# Patient Record
Sex: MALE | Race: BLACK OR AFRICAN AMERICAN | NOT HISPANIC OR LATINO | URBAN - METROPOLITAN AREA
[De-identification: names, ages, dates, MRNs, and addresses within clinical notes are randomized per-mention and may not be internally consistent; named-entity substitution may affect disease eponyms.]

---

## 2017-01-11 ENCOUNTER — EMERGENCY (EMERGENCY)
Facility: HOSPITAL | Age: 52
LOS: 1 days | Discharge: HOME | End: 2017-01-11
Admitting: EMERGENCY MEDICINE
Payer: SELF-PAY

## 2017-01-11 VITALS
DIASTOLIC BLOOD PRESSURE: 89 MMHG | HEART RATE: 134 BPM | RESPIRATION RATE: 18 BRPM | TEMPERATURE: 99 F | OXYGEN SATURATION: 100 % | SYSTOLIC BLOOD PRESSURE: 152 MMHG

## 2017-01-11 VITALS
HEART RATE: 111 BPM | DIASTOLIC BLOOD PRESSURE: 77 MMHG | OXYGEN SATURATION: 100 % | RESPIRATION RATE: 18 BRPM | SYSTOLIC BLOOD PRESSURE: 142 MMHG

## 2017-01-11 LAB
ALBUMIN SERPL ELPH-MCNC: 4.4 G/DL — SIGNIFICANT CHANGE UP (ref 3.3–5)
ALP SERPL-CCNC: 125 U/L — HIGH (ref 40–120)
ALT FLD-CCNC: 32 U/L — SIGNIFICANT CHANGE UP (ref 4–41)
AMPHET UR-MCNC: NEGATIVE — SIGNIFICANT CHANGE UP
APAP SERPL-MCNC: < 15 UG/ML — LOW (ref 15–25)
APPEARANCE UR: CLEAR — SIGNIFICANT CHANGE UP
AST SERPL-CCNC: 41 U/L — HIGH (ref 4–40)
BACTERIA # UR AUTO: SIGNIFICANT CHANGE UP
BARBITURATES MEASUREMENT: NEGATIVE — SIGNIFICANT CHANGE UP
BARBITURATES UR SCN-MCNC: NEGATIVE — SIGNIFICANT CHANGE UP
BASOPHILS # BLD AUTO: 0.02 K/UL — SIGNIFICANT CHANGE UP (ref 0–0.2)
BASOPHILS NFR BLD AUTO: 0.3 % — SIGNIFICANT CHANGE UP (ref 0–2)
BENZODIAZ SERPL-MCNC: NEGATIVE — SIGNIFICANT CHANGE UP
BENZODIAZ UR-MCNC: NEGATIVE — SIGNIFICANT CHANGE UP
BILIRUB SERPL-MCNC: 0.3 MG/DL — SIGNIFICANT CHANGE UP (ref 0.2–1.2)
BILIRUB UR-MCNC: NEGATIVE — SIGNIFICANT CHANGE UP
BLOOD UR QL VISUAL: NEGATIVE — SIGNIFICANT CHANGE UP
BUN SERPL-MCNC: 15 MG/DL — SIGNIFICANT CHANGE UP (ref 7–23)
CALCIUM SERPL-MCNC: 9.6 MG/DL — SIGNIFICANT CHANGE UP (ref 8.4–10.5)
CANNABINOIDS UR-MCNC: NEGATIVE — SIGNIFICANT CHANGE UP
CHLORIDE SERPL-SCNC: 106 MMOL/L — SIGNIFICANT CHANGE UP (ref 98–107)
CO2 SERPL-SCNC: 22 MMOL/L — SIGNIFICANT CHANGE UP (ref 22–31)
COCAINE METAB.OTHER UR-MCNC: NEGATIVE — SIGNIFICANT CHANGE UP
COLOR SPEC: YELLOW — SIGNIFICANT CHANGE UP
CREAT SERPL-MCNC: 1.05 MG/DL — SIGNIFICANT CHANGE UP (ref 0.5–1.3)
EOSINOPHIL # BLD AUTO: 0.1 K/UL — SIGNIFICANT CHANGE UP (ref 0–0.5)
EOSINOPHIL NFR BLD AUTO: 1.7 % — SIGNIFICANT CHANGE UP (ref 0–6)
ETHANOL BLD-MCNC: < 10 MG/DL — SIGNIFICANT CHANGE UP
GLUCOSE SERPL-MCNC: 136 MG/DL — HIGH (ref 70–99)
GLUCOSE UR-MCNC: NEGATIVE — SIGNIFICANT CHANGE UP
GRAN CASTS # UR COMP ASSIST: SIGNIFICANT CHANGE UP
HCT VFR BLD CALC: 44.9 % — SIGNIFICANT CHANGE UP (ref 39–50)
HGB BLD-MCNC: 15.2 G/DL — SIGNIFICANT CHANGE UP (ref 13–17)
HYALINE CASTS # UR AUTO: SIGNIFICANT CHANGE UP (ref 0–?)
IMM GRANULOCYTES NFR BLD AUTO: 0.5 % — SIGNIFICANT CHANGE UP (ref 0–1.5)
KETONES UR-MCNC: NEGATIVE — SIGNIFICANT CHANGE UP
LEUKOCYTE ESTERASE UR-ACNC: NEGATIVE — SIGNIFICANT CHANGE UP
LYMPHOCYTES # BLD AUTO: 1.82 K/UL — SIGNIFICANT CHANGE UP (ref 1–3.3)
LYMPHOCYTES # BLD AUTO: 30.8 % — SIGNIFICANT CHANGE UP (ref 13–44)
MCHC RBC-ENTMCNC: 31.1 PG — SIGNIFICANT CHANGE UP (ref 27–34)
MCHC RBC-ENTMCNC: 33.9 % — SIGNIFICANT CHANGE UP (ref 32–36)
MCV RBC AUTO: 92 FL — SIGNIFICANT CHANGE UP (ref 80–100)
METHADONE UR-MCNC: NEGATIVE — SIGNIFICANT CHANGE UP
MONOCYTES # BLD AUTO: 0.32 K/UL — SIGNIFICANT CHANGE UP (ref 0–0.9)
MONOCYTES NFR BLD AUTO: 5.4 % — SIGNIFICANT CHANGE UP (ref 2–14)
MUCOUS THREADS # UR AUTO: SIGNIFICANT CHANGE UP
NEUTROPHILS # BLD AUTO: 3.61 K/UL — SIGNIFICANT CHANGE UP (ref 1.8–7.4)
NEUTROPHILS NFR BLD AUTO: 61.3 % — SIGNIFICANT CHANGE UP (ref 43–77)
NITRITE UR-MCNC: NEGATIVE — SIGNIFICANT CHANGE UP
OPIATES UR-MCNC: NEGATIVE — SIGNIFICANT CHANGE UP
OXYCODONE UR-MCNC: NEGATIVE — SIGNIFICANT CHANGE UP
PCP UR-MCNC: NEGATIVE — SIGNIFICANT CHANGE UP
PH UR: 6 — SIGNIFICANT CHANGE UP (ref 4.6–8)
PLATELET # BLD AUTO: 242 K/UL — SIGNIFICANT CHANGE UP (ref 150–400)
PMV BLD: 12 FL — SIGNIFICANT CHANGE UP (ref 7–13)
POTASSIUM SERPL-MCNC: 3.9 MMOL/L — SIGNIFICANT CHANGE UP (ref 3.5–5.3)
POTASSIUM SERPL-SCNC: 3.9 MMOL/L — SIGNIFICANT CHANGE UP (ref 3.5–5.3)
PROT SERPL-MCNC: 8.2 G/DL — SIGNIFICANT CHANGE UP (ref 6–8.3)
PROT UR-MCNC: 30 — SIGNIFICANT CHANGE UP
RBC # BLD: 4.88 M/UL — SIGNIFICANT CHANGE UP (ref 4.2–5.8)
RBC # FLD: 12.4 % — SIGNIFICANT CHANGE UP (ref 10.3–14.5)
RBC CASTS # UR COMP ASSIST: SIGNIFICANT CHANGE UP (ref 0–?)
SALICYLATES SERPL-MCNC: < 5 MG/DL — LOW (ref 15–30)
SODIUM SERPL-SCNC: 143 MMOL/L — SIGNIFICANT CHANGE UP (ref 135–145)
SP GR SPEC: 1.02 — SIGNIFICANT CHANGE UP (ref 1–1.03)
SQUAMOUS # UR AUTO: SIGNIFICANT CHANGE UP
TSH SERPL-MCNC: 0.75 UIU/ML — SIGNIFICANT CHANGE UP (ref 0.27–4.2)
UROBILINOGEN FLD QL: NORMAL E.U. — SIGNIFICANT CHANGE UP (ref 0.1–0.2)
WBC # BLD: 5.9 K/UL — SIGNIFICANT CHANGE UP (ref 3.8–10.5)
WBC # FLD AUTO: 5.9 K/UL — SIGNIFICANT CHANGE UP (ref 3.8–10.5)
WBC UR QL: SIGNIFICANT CHANGE UP (ref 0–?)

## 2017-01-11 PROCEDURE — 93010 ELECTROCARDIOGRAM REPORT: CPT

## 2017-01-11 PROCEDURE — 99284 EMERGENCY DEPT VISIT MOD MDM: CPT | Mod: 25

## 2017-01-11 PROCEDURE — 90792 PSYCH DIAG EVAL W/MED SRVCS: CPT

## 2017-01-11 NOTE — ED BEHAVIORAL HEALTH ASSESSMENT NOTE - HPI (INCLUDE ILLNESS QUALITY, SEVERITY, DURATION, TIMING, CONTEXT, MODIFYING FACTORS, ASSOCIATED SIGNS AND SYMPTOMS)
50 year old AA male, single, non-caregiver, unemployed, domiciled with mother in a private house in [place], prior psychiatric history of schizophrenia last admitted at [facility] 02/22/16-3/15/16, no prior medical issues or history of substance abuse,  brought to the ER today by EMS and [employer] as per EMS patient's mother called them to saying patient is non-compliant with his medication and was verbally threatening today.    Collateral information obtained from mother Julissa Dorsey [phone], by SW, see  note.     On interview, patient reports that he was brought in because his mother is a "liar and making up stories". This was same complaint as last presentation 2/2016 as well as similar overall presentation. He reports that when he woke up today he saw several police that came to the house and told him he had to come get checked out. Reports that he is on abilify and takes it intermittently. Denies SI/HI/I/P. Patient denies any depressive symptoms including depressed mood, anhedonia, changes in energy/concentration/appetite, sleep disturbances, or feelings of guilt. Patient denies manic symptoms including elevated mood, increased irritability, mood lability, distractibility, grandiosity, pressured speech, increase in goal-directed activity, or decreased need for sleep. Patient denies any psychotic symptoms including paranoia, ideas of reference, thought insertion/broadcasting, or auditory/visual/olfactory/tactile/gustatory hallucinations.     After careful review of last inpatient record, patient had reported that he felt that [employer] [place] was part of a gang and after him, but he denied these thoughts today and denies that anyone is after him. During last admission patient also refused medications on the unit after 2 days and was taken to court for medication over objection, which was denied and patient was allowed to be discharged at that point because he was not felt to be a danger to others at that time.

## 2017-01-11 NOTE — ED ADULT NURSE NOTE - OBJECTIVE STATEMENT
As per ems, mom called to report of med non compliance and aggression @ home.  Pt. report that "my mom is lying"  Pt. denies aggression, but appears irritable, guarded and paranoid.   Cooperative @ present.  pt. denies si/hi/ah, denies drug/etoh use.  Pt. checked for safety, belongings secured.

## 2017-01-11 NOTE — ED PROVIDER NOTE - CHPI ED SYMPTOMS NEG
no suicidal/no weight loss/no homicidal/no hallucinations/no weakness/no change in level of consciousness/no disorientation

## 2017-01-11 NOTE — ED BEHAVIORAL HEALTH ASSESSMENT NOTE - RISK ASSESSMENT
risk factors include prior admissions, medication non compliance    Protective factors- Protective factors include no suicide attempts, no violence history, no access to guns, no  no substance abuse, supportive family, no suicidal ideation or homicidal ideation, hopefulness for future.

## 2017-01-11 NOTE — ED BEHAVIORAL HEALTH ASSESSMENT NOTE - OTHER PAST PSYCHIATRIC HISTORY (INCLUDE DETAILS REGARDING ONSET, COURSE OF ILLNESS, INPATIENT/OUTPATIENT TREATMENT)
One prior admission at [facility] in 1990s when he locked himself in the apartment and refused to leave. No documented or reported hx of suicide attempt or aggression.  Given  referral after ED visit 6/15  In AO from 2/20/2001 from 11/21/2014    According to last note from 8/14 patient was denying delusions, paranoia or other psychotic symptoms. Note indicated RM from symptoms.

## 2017-01-11 NOTE — ED BEHAVIORAL HEALTH ASSESSMENT NOTE - SUMMARY
50 year old AA male, single, non-caregiver, unemployed, domiciled with mother in a private house in [place], prior psychiatric history of schizophrenia last admitted at [facility] 02/22/16-3/15/16, no prior medical issues or history of substance abuse,  brought to the ER today by EMS and [employer] as per EMS patient's mother called them to saying patient is non-compliant with his medication and was verbally threatening today.    Patient had a similar presentation in 2/2016 and was admitted - [facility] chart review done of that admission. Patient refused medication and was taken court for medication over objection, where the patient represented himself, and was allowed to be discharged as he was deemed not to be a danger to others or himself. Patient today was verbally agitated towards mother and has been non compliant with his medications, however, did not harm her and has never been physical with her per SW reports. Patient is denying SI/HI/I/P as well as sofi and acute psychotic symptoms. He still has same delusion that mother is "telling lies about me", but does not want to harm her. He is denying delusions about [employer] [place] being part of a gang and after him, which he had endorsed at the time of last admission. He is refusing voluntary admission and does not meet criteria for involuntary admission at this time. He will be discharged home to follow up with outpatient referrals provided, as well as shelter information as mother does not want patient to return home.

## 2017-01-11 NOTE — ED PROVIDER NOTE - MEDICAL DECISION MAKING DETAILS
51y Male hx schizophrenia non compliant with meds brought to ed by [facility] for psych eval.  Pt appears agitated and guarded on initial eval.  Pt is w/o visible signs of injuries on exam- Head- NCAT, labs unrevealing.  Sinus tach 135 on initial ECG w/ first degree AV block in setting of agitation but no narrative suggestive of ACS-   on repeat ECG w/ one liter of po hydration-  Will continue to hydrate and dispo as per psych-

## 2017-01-11 NOTE — ED PROVIDER NOTE - CARE PLAN
Principal Discharge DX:	Schizophrenia  Instructions for follow-up, activity and diet:	Please make sure to continue drinking water to keep yourself well hydrated.  Return for dizziness, chest pain or shortness of breath, palpitation, nausea or vomiting, visual changes.  Please return for any other concerns.

## 2017-01-11 NOTE — ED BEHAVIORAL HEALTH ASSESSMENT NOTE - DESCRIPTION
Patient was calm and cooperative he was not aggressive or violent. 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

## 2017-01-11 NOTE — ED PROVIDER NOTE - OBJECTIVE STATEMENT
The patient is a 51y Male hx schizophrenia non compliant with meds brought to ED by [facility] for psych eval.  Pt states that his mother is lying.  Pt denies recent injuries, trauma or falls, no headache, back or neck pain, no abd pain, nausea or vomiting, no fever or chills, no cp or sob, no palpitation or diaphoresis or jaw pain.  Denies etoh or illicit drugs, no SI/HI, no AVH.  Endorsed no other symptoms.

## 2017-01-11 NOTE — ED PROVIDER NOTE - PLAN OF CARE
Please make sure to continue drinking water to keep yourself well hydrated.  Return for dizziness, chest pain or shortness of breath, palpitation, nausea or vomiting, visual changes.  Please return for any other concerns.
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
## 2017-10-19 ENCOUNTER — INPATIENT (INPATIENT)
Facility: HOSPITAL | Age: 52
LOS: 25 days | Discharge: HOME | End: 2017-11-14
Attending: PSYCHIATRY & NEUROLOGY | Admitting: PSYCHIATRY & NEUROLOGY
Payer: MEDICAID

## 2017-10-19 VITALS
SYSTOLIC BLOOD PRESSURE: 151 MMHG | OXYGEN SATURATION: 99 % | DIASTOLIC BLOOD PRESSURE: 88 MMHG | RESPIRATION RATE: 15 BRPM | HEART RATE: 96 BPM | TEMPERATURE: 98 F

## 2017-10-19 LAB
ALBUMIN SERPL ELPH-MCNC: 4.4 G/DL — SIGNIFICANT CHANGE UP (ref 3.3–5)
ALP SERPL-CCNC: 129 U/L — HIGH (ref 40–120)
ALT FLD-CCNC: 21 U/L — SIGNIFICANT CHANGE UP (ref 4–41)
APPEARANCE UR: CLEAR — SIGNIFICANT CHANGE UP
AST SERPL-CCNC: 26 U/L — SIGNIFICANT CHANGE UP (ref 4–40)
BASOPHILS # BLD AUTO: 0.05 K/UL — SIGNIFICANT CHANGE UP (ref 0–0.2)
BASOPHILS NFR BLD AUTO: 0.8 % — SIGNIFICANT CHANGE UP (ref 0–2)
BILIRUB SERPL-MCNC: 0.3 MG/DL — SIGNIFICANT CHANGE UP (ref 0.2–1.2)
BILIRUB UR-MCNC: NEGATIVE — SIGNIFICANT CHANGE UP
BLOOD UR QL VISUAL: NEGATIVE — SIGNIFICANT CHANGE UP
BUN SERPL-MCNC: 13 MG/DL — SIGNIFICANT CHANGE UP (ref 7–23)
CALCIUM SERPL-MCNC: 9.3 MG/DL — SIGNIFICANT CHANGE UP (ref 8.4–10.5)
CHLORIDE SERPL-SCNC: 100 MMOL/L — SIGNIFICANT CHANGE UP (ref 98–107)
CO2 SERPL-SCNC: 25 MMOL/L — SIGNIFICANT CHANGE UP (ref 22–31)
COLOR SPEC: YELLOW — SIGNIFICANT CHANGE UP
CREAT SERPL-MCNC: 1.04 MG/DL — SIGNIFICANT CHANGE UP (ref 0.5–1.3)
EOSINOPHIL # BLD AUTO: 0.32 K/UL — SIGNIFICANT CHANGE UP (ref 0–0.5)
EOSINOPHIL NFR BLD AUTO: 5.1 % — SIGNIFICANT CHANGE UP (ref 0–6)
GLUCOSE SERPL-MCNC: 144 MG/DL — HIGH (ref 70–99)
GLUCOSE UR-MCNC: NEGATIVE — SIGNIFICANT CHANGE UP
GRAN CASTS # UR COMP ASSIST: SIGNIFICANT CHANGE UP
HCT VFR BLD CALC: 44.5 % — SIGNIFICANT CHANGE UP (ref 39–50)
HGB BLD-MCNC: 14.7 G/DL — SIGNIFICANT CHANGE UP (ref 13–17)
HYALINE CASTS # UR AUTO: SIGNIFICANT CHANGE UP (ref 0–?)
IMM GRANULOCYTES # BLD AUTO: 0.01 # — SIGNIFICANT CHANGE UP
IMM GRANULOCYTES NFR BLD AUTO: 0.2 % — SIGNIFICANT CHANGE UP (ref 0–1.5)
KETONES UR-MCNC: NEGATIVE — SIGNIFICANT CHANGE UP
LEUKOCYTE ESTERASE UR-ACNC: NEGATIVE — SIGNIFICANT CHANGE UP
LYMPHOCYTES # BLD AUTO: 2.6 K/UL — SIGNIFICANT CHANGE UP (ref 1–3.3)
LYMPHOCYTES # BLD AUTO: 41.1 % — SIGNIFICANT CHANGE UP (ref 13–44)
MCHC RBC-ENTMCNC: 30.5 PG — SIGNIFICANT CHANGE UP (ref 27–34)
MCHC RBC-ENTMCNC: 33 % — SIGNIFICANT CHANGE UP (ref 32–36)
MCV RBC AUTO: 92.3 FL — SIGNIFICANT CHANGE UP (ref 80–100)
MONOCYTES # BLD AUTO: 0.5 K/UL — SIGNIFICANT CHANGE UP (ref 0–0.9)
MONOCYTES NFR BLD AUTO: 7.9 % — SIGNIFICANT CHANGE UP (ref 2–14)
MUCOUS THREADS # UR AUTO: SIGNIFICANT CHANGE UP
NEUTROPHILS # BLD AUTO: 2.85 K/UL — SIGNIFICANT CHANGE UP (ref 1.8–7.4)
NEUTROPHILS NFR BLD AUTO: 44.9 % — SIGNIFICANT CHANGE UP (ref 43–77)
NITRITE UR-MCNC: NEGATIVE — SIGNIFICANT CHANGE UP
NRBC # FLD: 0 — SIGNIFICANT CHANGE UP
PH UR: 6 — SIGNIFICANT CHANGE UP (ref 4.6–8)
PLATELET # BLD AUTO: 190 K/UL — SIGNIFICANT CHANGE UP (ref 150–400)
PMV BLD: 12 FL — SIGNIFICANT CHANGE UP (ref 7–13)
POTASSIUM SERPL-MCNC: 3 MMOL/L — LOW (ref 3.5–5.3)
POTASSIUM SERPL-SCNC: 3 MMOL/L — LOW (ref 3.5–5.3)
PROT SERPL-MCNC: 8 G/DL — SIGNIFICANT CHANGE UP (ref 6–8.3)
PROT UR-MCNC: 100 — SIGNIFICANT CHANGE UP
RBC # BLD: 4.82 M/UL — SIGNIFICANT CHANGE UP (ref 4.2–5.8)
RBC # FLD: 12.5 % — SIGNIFICANT CHANGE UP (ref 10.3–14.5)
RBC CASTS # UR COMP ASSIST: SIGNIFICANT CHANGE UP (ref 0–?)
SODIUM SERPL-SCNC: 141 MMOL/L — SIGNIFICANT CHANGE UP (ref 135–145)
SP GR SPEC: 1.02 — SIGNIFICANT CHANGE UP (ref 1–1.03)
SQUAMOUS # UR AUTO: SIGNIFICANT CHANGE UP
UROBILINOGEN FLD QL: NORMAL E.U. — SIGNIFICANT CHANGE UP (ref 0.1–0.2)
WBC # BLD: 6.33 K/UL — SIGNIFICANT CHANGE UP (ref 3.8–10.5)
WBC # FLD AUTO: 6.33 K/UL — SIGNIFICANT CHANGE UP (ref 3.8–10.5)
WBC UR QL: SIGNIFICANT CHANGE UP (ref 0–?)

## 2017-10-19 RX ORDER — HALOPERIDOL DECANOATE 100 MG/ML
5 INJECTION INTRAMUSCULAR ONCE
Qty: 0 | Refills: 0 | Status: COMPLETED | OUTPATIENT
Start: 2017-10-19 | End: 2017-10-19

## 2017-10-19 RX ORDER — DIPHENHYDRAMINE HCL 50 MG
50 CAPSULE ORAL ONCE
Qty: 0 | Refills: 0 | Status: COMPLETED | OUTPATIENT
Start: 2017-10-19 | End: 2017-10-19

## 2017-10-19 RX ADMIN — Medication 2 MILLIGRAM(S): at 22:23

## 2017-10-19 RX ADMIN — Medication 50 MILLIGRAM(S): at 22:23

## 2017-10-19 RX ADMIN — HALOPERIDOL DECANOATE 5 MILLIGRAM(S): 100 INJECTION INTRAMUSCULAR at 22:23

## 2017-10-19 NOTE — ED ADULT TRIAGE NOTE - CHIEF COMPLAINT QUOTE
Pt from home, history of paranoid schizophrenia.  Mother states pt refuses to take medications, paranoia has been getting worse.   NP called, pt taken to  by EMS.

## 2017-10-19 NOTE — ED PROVIDER NOTE - OBJECTIVE STATEMENT
This is a 52 year old Male PMHX paranoid schizophrenia BIBIA from Home for psych eval r/t medication noncompliance. Per EMS patient lives with his mother who cares for patient. Reports he has been medication noncompliant with Abilify for 1 1.2 years and is recently decompensating. Mother stated to EMS she feels unsafe.  Patient arrives agitated, argumentative and paranoid. Patient posed a threat to self and staff and required Ativan 2mg/Haldol 5mg/Benadryl 50 mg IM x 1 for safety.

## 2017-10-19 NOTE — ED ADULT NURSE NOTE - OBJECTIVE STATEMENT
BIB ems, mother called 911 because has patient has been decompensating at home and she feels "unsafe". As per ems and mother, Pt has hx of paranoid schizophrenia and has been noncompliant with treatment over one year. Pt is awake, hyperverbal with pressured speech. Pt is disorganized and irritable, requires frequent redirection. Unable to assess for s/i h/i or drug use.

## 2017-10-19 NOTE — ED BEHAVIORAL HEALTH NOTE - BEHAVIORAL HEALTH NOTE
Writer spoke with patient’s mother, Julissa Dorsey, [phone], on the phone, for collateral information. She reported the following:    The patient resides with the informant. He has had 2 past IP episodes, at [facility], which chart review indicates occurred in 2016 and 2015. He has never attended OP treatment, and has been off medications since the passing of his father in 2012, except minimally during IP care. Today, he was aggressive, so the informant called 911.    The patient is irritable and somewhat verbally aggressive at baseline. However, for the past few weeks this behavior has intensified so much that the informant is now so afraid of the patient that she stated she cannot have him in her home at this time. When the informant entered the kitchen, where the patient was, he yelled at her, telling her to get out of the house, and stop harassing him. He told her at another time to go upstairs to find something to eat, though there is no food upstairs. He got in her face, yelling “I’m telling you, you better get out of here.” The informant asked what he would do to her, and he refused to answer. He was yelling at his mother that she is “crazy, stupid and psychotic,” which he has never done before. He has been eating a great deal, but his weight is stable. He sleeps with reverse diurnal variation, pacing at night. He has been responding to internal stimuli, smiling to himself often. He is totally socially isolative and will not speak with his mother except about food, briefly. Since his mother is unable to go to the store, he does the shopping. However, he is so paranoid that he looks outside to see if anyone is near. He yells at his mother if she looks at him, telling him it is “illegal and a crime.” He jumps if his mother is behind him, demanding to know what she was planning to do. He has been running water excessively, such as by washing dishes many times, creating an alarming water bill. This is a new behavior. The patient has not expressed passive or active suicidal ideation, and has never engaged in self-injurious behavior. He has not told the informant what he may do to her or anyone else. He does not have access to a gun. He has not been violent or damaged property.     At the time of this writing and the end of writer’s shift, the patient had not yet been evaluated, and a disposition had not been determined. Writer spoke with patient’s mother, Julissa Dorsey, [phone], on the phone, for collateral information. She reported the following:    The patient resides with the informant. He has had 2 past IP episodes, at [facility], which chart review indicates occurred in 2016 and 2015. He has never attended OP treatment, and has been off medications since the passing of his father in 2012, except minimally during IP care. Today, he was aggressive, so the informant called 911.    The patient is irritable and somewhat verbally aggressive at baseline. However, for the past few weeks this behavior has intensified so much that the informant is now so afraid of the patient that she stated she cannot have him in her home at this time. When the informant entered the kitchen, where the patient was, he yelled at her, telling her to get out of the house, and stop harassing him. He told her at another time to go upstairs to find something to eat, though there is no food upstairs. He got in her face, yelling “I’m telling you, you better get out of here.” The informant asked what he would do to her, and he refused to answer. He was yelling at his mother that she is “crazy, stupid and psychotic,” which he has never done before. He has been eating a great deal, but his weight is stable. He sleeps with reverse diurnal variation, pacing at night. He has been responding to internal stimuli, smiling to himself often. He is totally socially isolative and will not speak with his mother except about food, briefly. Since his mother is unable to go to the store, he does the shopping. However, he is so paranoid that he looks outside to see if anyone is near. He yells at his mother if she looks at him, telling him it is “illegal and a crime.” He jumps if his mother is behind him, demanding to know what she was planning to do. He has been running water excessively, such as by washing dishes many times, creating an alarming water bill. This is a new behavior. The patient has not expressed passive or active suicidal ideation, and has never engaged in self-injurious behavior. He has not told the informant what he may do to her or anyone else. He does not have access to a gun. He has not been violent or damaged property.     The informant requests that the patient be admitted to inpatient care. At the time of this writing and the end of writer’s shift, the patient had not yet been evaluated, and a disposition had not been determined.

## 2017-10-19 NOTE — ED PROVIDER NOTE - PROGRESS NOTE DETAILS
Brittany: Medically cleared then signed out to me. Awaiting  eval/reassessment. Klepfish: Stable. Awaiting  reassessment, likely [facility] admit.

## 2017-10-19 NOTE — ED PROVIDER NOTE - MEDICAL DECISION MAKING DETAILS
This is a 52 year old Male PMHX paranoid schizophrenia BIBIA from Home for psych eval r/t medication noncompliance. Per EMS patient lives with his mother who cares for patient. Reports he has been medication noncompliant with Abilify for 1 1.2 years and is recently decompensating. Medical evaluation performed. There is no clinical evidence of intoxication or any acute medical problem requiring immediate intervention. Final disposition will be determined by psychiatrist.

## 2017-10-19 NOTE — ED BEHAVIORAL HEALTH NOTE - BEHAVIORAL HEALTH NOTE
Writer spoke with patient’s mother, Julissa Dorsey, [phone], on the phone, for collateral information. She reported the following:    The patient resides with the informant. He has had 2 past IP episodes, at [facility], which chart review indicates occurred in 2016 and 2015. He has never attended OP treatment, and has been off medications since the passing of his father in 2012, except minimally during IP care. Today, he was aggressive, so the informant called 911.    The patient is irritable and somewhat verbally aggressive at baseline. However, for the past few weeks this behavior has intensified so much that the informant is now so afraid of the patient that she stated she cannot have him in her home at this time. When the informant entered the kitchen, where the patient was, he yelled at her, telling her to get out of the house, and stop harassing him. He told her at another time to go upstairs to find something to eat, though there is no food upstairs. He got in her face, yelling “I’m telling you, you better get out of here.” The informant asked what he would do to her, and he refused to answer. He was yelling at his mother that she is “crazy, stupid and psychotic,” which he has never done before. He has been eating a great deal, but his weight is stable. He sleeps with reverse diurnal variation, pacing at night. He has been responding to internal stimuli, smiling to himself often. He is totally socially isolative and will not speak with his mother except about food, briefly. Since his mother is unable to go to the store, he does the shopping. However, he is so paranoid that he looks outside to see if anyone is near. He yells at his mother if she looks at him, telling him it is “illegal and a crime.” He jumps if his mother is behind him, demanding to know what she was planning to do. He has been running water excessively, such as by washing dishes many times, creating an alarming water bill. This is a new behavior. The patient has not expressed passive or active suicidal ideation, and has never engaged in self-injurious behavior. He has not told the informant what he may do to her or anyone else. He does not have access to a gun. He has not been violent or damaged property.

## 2017-10-20 DIAGNOSIS — F22 DELUSIONAL DISORDERS: ICD-10-CM

## 2017-10-20 PROBLEM — F20.9 SCHIZOPHRENIA, UNSPECIFIED: Chronic | Status: ACTIVE | Noted: 2017-01-11

## 2017-10-20 LAB
AMPHET UR-MCNC: NEGATIVE — SIGNIFICANT CHANGE UP
APAP SERPL-MCNC: < 15 UG/ML — LOW (ref 15–25)
BARBITURATES MEASUREMENT: NEGATIVE — SIGNIFICANT CHANGE UP
BARBITURATES UR SCN-MCNC: NEGATIVE — SIGNIFICANT CHANGE UP
BENZODIAZ SERPL-MCNC: NEGATIVE — SIGNIFICANT CHANGE UP
BENZODIAZ UR-MCNC: NEGATIVE — SIGNIFICANT CHANGE UP
CANNABINOIDS UR-MCNC: NEGATIVE — SIGNIFICANT CHANGE UP
COCAINE METAB.OTHER UR-MCNC: NEGATIVE — SIGNIFICANT CHANGE UP
ETHANOL BLD-MCNC: < 10 MG/DL — SIGNIFICANT CHANGE UP
METHADONE UR-MCNC: NEGATIVE — SIGNIFICANT CHANGE UP
OPIATES UR-MCNC: NEGATIVE — SIGNIFICANT CHANGE UP
OXYCODONE UR-MCNC: NEGATIVE — SIGNIFICANT CHANGE UP
PCP UR-MCNC: NEGATIVE — SIGNIFICANT CHANGE UP
SALICYLATES SERPL-MCNC: < 5 MG/DL — LOW (ref 15–30)
TSH SERPL-MCNC: 1.6 UIU/ML — SIGNIFICANT CHANGE UP (ref 0.27–4.2)

## 2017-10-20 PROCEDURE — 99285 EMERGENCY DEPT VISIT HI MDM: CPT

## 2017-10-20 PROCEDURE — 93010 ELECTROCARDIOGRAM REPORT: CPT

## 2017-10-20 RX ORDER — DIPHENHYDRAMINE HCL 50 MG
50 CAPSULE ORAL ONCE
Qty: 0 | Refills: 0 | Status: DISCONTINUED | OUTPATIENT
Start: 2017-10-20 | End: 2017-11-14

## 2017-10-20 RX ORDER — HALOPERIDOL DECANOATE 100 MG/ML
5 INJECTION INTRAMUSCULAR ONCE
Qty: 0 | Refills: 0 | Status: DISCONTINUED | OUTPATIENT
Start: 2017-10-20 | End: 2017-11-14

## 2017-10-20 RX ORDER — HALOPERIDOL DECANOATE 100 MG/ML
5 INJECTION INTRAMUSCULAR EVERY 6 HOURS
Qty: 0 | Refills: 0 | Status: DISCONTINUED | OUTPATIENT
Start: 2017-10-20 | End: 2017-11-14

## 2017-10-20 RX ORDER — ARIPIPRAZOLE 15 MG/1
5 TABLET ORAL DAILY
Qty: 0 | Refills: 0 | Status: DISCONTINUED | OUTPATIENT
Start: 2017-10-20 | End: 2017-11-14

## 2017-10-20 RX ORDER — POTASSIUM CHLORIDE 20 MEQ
40 PACKET (EA) ORAL ONCE
Qty: 0 | Refills: 0 | Status: COMPLETED | OUTPATIENT
Start: 2017-10-20 | End: 2017-10-20

## 2017-10-20 RX ORDER — DIPHENHYDRAMINE HCL 50 MG
50 CAPSULE ORAL EVERY 6 HOURS
Qty: 0 | Refills: 0 | Status: DISCONTINUED | OUTPATIENT
Start: 2017-10-20 | End: 2017-11-14

## 2017-10-20 RX ORDER — DIPHENHYDRAMINE HCL 50 MG
50 CAPSULE ORAL ONCE
Qty: 0 | Refills: 0 | Status: COMPLETED | OUTPATIENT
Start: 2017-10-20 | End: 2017-10-20

## 2017-10-20 RX ORDER — HALOPERIDOL DECANOATE 100 MG/ML
5 INJECTION INTRAMUSCULAR ONCE
Qty: 0 | Refills: 0 | Status: COMPLETED | OUTPATIENT
Start: 2017-10-20 | End: 2017-10-20

## 2017-10-20 RX ADMIN — HALOPERIDOL DECANOATE 5 MILLIGRAM(S): 100 INJECTION INTRAMUSCULAR at 07:08

## 2017-10-20 RX ADMIN — Medication 2 MILLIGRAM(S): at 07:08

## 2017-10-20 RX ADMIN — Medication 50 MILLIGRAM(S): at 07:08

## 2017-10-20 NOTE — ED BEHAVIORAL HEALTH ASSESSMENT NOTE - HPI (INCLUDE ILLNESS QUALITY, SEVERITY, DURATION, TIMING, CONTEXT, MODIFYING FACTORS, ASSOCIATED SIGNS AND SYMPTOMS)
52 year old AA male, single, non-caregiver, unemployed, domiciled with mother in a private house in [place], prior psychiatric history of schizophrenia, no prior medical issues or history of substance abuse, BIBEMS activated by mother for worsening paranoia and agitation in context of medication noncompliance.       On arrival, pt agitated, argumentative and paranoid. Patient posed a threat to self and staff and required Ativan 2mg/Haldol 5mg/Benadryl 50 mg IM x 1 for safety at 22:13.    See  note for collateral obtained from mother. In summary, pt has been increasingly paranoid, verbally aggressive, threatening, agitated, as well as responding to internal stimuli.     On interview, patient reports that he was brought in because his mother is a "liar and making up stories". This was same complaint as last presentation 2/2016 as well as similar overall presentation. He reports that when he woke up today he saw several police that came to the house and told him he had to come get checked out. Reports that he is on abilify and takes it intermittently. Denies SI/HI/I/P. Patient denies any depressive symptoms including depressed mood, anhedonia, changes in energy/concentration/appetite, sleep disturbances, or feelings of guilt. Patient denies manic symptoms including elevated mood, increased irritability, mood lability, distractibility, grandiosity, pressured speech, increase in goal-directed activity, or decreased need for sleep. Patient denies any psychotic symptoms including paranoia, ideas of reference, thought insertion/broadcasting, or auditory/visual/olfactory/tactile/gustatory hallucinations.     After careful review of last inpatient record, patient had reported that he felt that [employer] [place] was part of a gang and after him, but he denied these thoughts today and denies that anyone is after him. During last admission patient also refused medications on the unit after 2 days and was taken to court for medication over objection, which was denied and patient was allowed to be discharged at that point because he was not felt to be a danger to others at that time. 52 year old AA male, single, non-caregiver, unemployed, domiciled with mother in a private house in [place], prior psychiatric history of schizophrenia, no prior medical issues or history of substance abuse, BIBEMS activated by mother for worsening paranoia and agitation in context of medication noncompliance.       On arrival, pt agitated, argumentative and paranoid. Patient posed a threat to self and staff and required Ativan 2mg/Haldol 5mg/Benadryl 50 mg IM x 1 for safety at 22:13.    See  note for collateral obtained from mother. In summary, pt has been increasingly paranoid, verbally aggressive, threatening, agitated, as well as responding to internal stimuli.     Pt was sedated throughout the night due to prn medication. He was interviewed in the morning when he was awake and alert. On assessment, pt states his mother was harassing him and "swinging her arms" at him. He doesn't know why EMS brought him to the hospital, as he states he doesn't have any psychiatric problems. He repeatedly states "I don't have any problems, my mother has psychiatric problems." He states she has been harassing him for years. He denies being agitated or threatening her. He denies being agitated upon arrival to  ED and doesn't know why he was medicated. He denies all psychiatric symptoms. Denies depressed mood or anhedonia, denies psychotic or manic symptoms. He denies SI/HI. States he hasn't taken Abilify in a year or so because he doesn't need psychiatric medication. Denies substance use.

## 2017-10-20 NOTE — ED ADULT NURSE REASSESSMENT NOTE - STATUS
psych eval completed, pt is medically cleared  for [facility] admission to [facility]

## 2017-10-20 NOTE — ED BEHAVIORAL HEALTH ASSESSMENT NOTE - PSYCHIATRIC ISSUES AND PLAN (INCLUDE STANDING AND PRN MEDICATION)
Defer standing medication to inpatient team; can use Haldol 5 mg, Ativan 2 mg, Benadryl 50 mg PO/IM prn agitation

## 2017-10-20 NOTE — ED ADULT NURSE REASSESSMENT NOTE - GENERAL PATIENT STATE
pt awakened, irritable with poor insight. Pt refusing to cooperate with [facility] admission and demanded to leave hospital, denying psychiatric hx. Pt receievd STAT Ativan 2/Haldol 5/Benadryl 50mg IM.

## 2017-10-20 NOTE — ED ADULT NURSE REASSESSMENT NOTE - NS ED NURSE REASSESS COMMENT FT1
Received report from night RN DY pt lying on stretcher in nad sedated but arousable breathing even & unlabored eval on going.

## 2017-10-20 NOTE — ED BEHAVIORAL HEALTH ASSESSMENT NOTE - RISK ASSESSMENT
risk factors include prior admissions, medication non compliance    Protective factors- Protective factors include no suicide attempts, no violence history, no access to guns, no  no substance abuse, supportive family, no suicidal ideation or homicidal ideation, hopefulness for future. pt is at acutely elevated risk of harm to others

## 2017-10-20 NOTE — ED BEHAVIORAL HEALTH ASSESSMENT NOTE - OTHER PAST PSYCHIATRIC HISTORY (INCLUDE DETAILS REGARDING ONSET, COURSE OF ILLNESS, INPATIENT/OUTPATIENT TREATMENT)
One prior admission at [facility] in 1990s when he locked himself in the apartment and refused to leave. No documented or reported hx of suicide attempt or aggression.  Given  referral after ED visit 6/15  In AO from 2/20/2001 from 11/21/2014    According to last note from 8/14 patient was denying delusions, paranoia or other psychotic symptoms. Note indicated RM from symptoms. One prior admission at [facility] in 1990s when he locked himself in the apartment and refused to leave. No documented or reported hx of suicide attempt or aggression.  In [facility] from 2/20/2001 from 11/21/2014

## 2017-10-20 NOTE — ED BEHAVIORAL HEALTH ASSESSMENT NOTE - SUMMARY
52 year old AA male, single, non-caregiver, unemployed, domiciled with mother in a private house in [place], prior psychiatric history of schizophrenia, no prior medical issues or history of substance abuse, BIBEMS activated by mother for worsening paranoia and agitation in context of medication noncompliance.       , pt has been increasingly paranoid, verbally aggressive, threatening, agitated, as well as responding to internal stimuli.     Patient had a similar presentation in 2/2016 and was admitted - [facility] chart review done of that admission. Patient refused medication and was taken court for medication over objection, where the patient represented himself, and was allowed to be discharged as he was deemed not to be a danger to others or himself. Patient today was verbally agitated towards mother and has been non compliant with his medications, however, did not harm her and has never been physical with her per  reports. Patient is denying SI/HI/I/P as well as sofi and acute psychotic symptoms. He still has same delusion that mother is "telling lies about me", but does not want to harm her. He is denying delusions about [employer] [place] being part of a gang and after him, which he had endorsed at the time of last admission. He is refusing voluntary admission and does not meet criteria for involuntary admission at this time. He will be discharged home to follow up with outpatient referrals provided, as well as shelter information as mother does not want patient to return home. 52 year old AA male, single, non-caregiver, unemployed, domiciled with mother in a private house in [place], prior psychiatric history of schizophrenia, no prior medical issues or history of substance abuse, BIBEMS activated by mother for worsening paranoia and agitation in context of medication noncompliance.       On arrival, pt was agitated and paranoid, requiring prn medications. Per mother, pt has been increasingly paranoid, verbally aggressive, threatening, agitated, as well as responding to internal stimuli.   Pt has no insight into his mental illness. Pt presents an acute danger to others and requires inpatient psychiatric admission for safety and stabilization.

## 2017-10-20 NOTE — ED BEHAVIORAL HEALTH ASSESSMENT NOTE - DESCRIPTION
Patient was calm and cooperative he was not aggressive or violent. 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 Patient posed a threat to self and staff and required Ativan 2mg/Haldol 5mg/Benadryl 50 mg IM x 1 for safety at 22:13. domiciled with mother, unemployed, single, no dependents, previously worked as a  (last worked 2007)

## 2017-10-21 PROCEDURE — 99222 1ST HOSP IP/OBS MODERATE 55: CPT

## 2017-10-22 PROCEDURE — 99232 SBSQ HOSP IP/OBS MODERATE 35: CPT

## 2017-10-24 PROCEDURE — 99232 SBSQ HOSP IP/OBS MODERATE 35: CPT

## 2017-10-25 PROCEDURE — 99232 SBSQ HOSP IP/OBS MODERATE 35: CPT

## 2017-10-26 PROCEDURE — 99232 SBSQ HOSP IP/OBS MODERATE 35: CPT

## 2017-10-27 PROCEDURE — 99232 SBSQ HOSP IP/OBS MODERATE 35: CPT

## 2017-10-28 VITALS — TEMPERATURE: 97 F

## 2017-10-30 PROCEDURE — 99232 SBSQ HOSP IP/OBS MODERATE 35: CPT

## 2017-10-31 PROCEDURE — 99232 SBSQ HOSP IP/OBS MODERATE 35: CPT

## 2017-11-01 PROCEDURE — 99232 SBSQ HOSP IP/OBS MODERATE 35: CPT

## 2017-11-06 PROCEDURE — 99232 SBSQ HOSP IP/OBS MODERATE 35: CPT

## 2017-11-07 PROCEDURE — 99232 SBSQ HOSP IP/OBS MODERATE 35: CPT

## 2017-11-08 PROCEDURE — 99232 SBSQ HOSP IP/OBS MODERATE 35: CPT

## 2017-11-09 PROCEDURE — 99232 SBSQ HOSP IP/OBS MODERATE 35: CPT

## 2017-11-10 PROCEDURE — 99232 SBSQ HOSP IP/OBS MODERATE 35: CPT

## 2017-11-13 PROCEDURE — 99232 SBSQ HOSP IP/OBS MODERATE 35: CPT

## 2017-11-14 PROCEDURE — 99232 SBSQ HOSP IP/OBS MODERATE 35: CPT

## 2017-11-14 RX ORDER — ARIPIPRAZOLE 15 MG/1
1 TABLET ORAL
Qty: 0 | Refills: 0 | COMMUNITY
Start: 2017-11-14

## 2018-03-17 ENCOUNTER — INPATIENT (INPATIENT)
Facility: HOSPITAL | Age: 53
LOS: 110 days | Discharge: PSYCHIATRIC OR SUBSTANCE USE FACILITY | End: 2018-07-06
Attending: PSYCHIATRY & NEUROLOGY | Admitting: PSYCHIATRY & NEUROLOGY
Payer: MEDICAID

## 2018-03-17 VITALS
DIASTOLIC BLOOD PRESSURE: 72 MMHG | SYSTOLIC BLOOD PRESSURE: 140 MMHG | HEART RATE: 92 BPM | OXYGEN SATURATION: 100 % | RESPIRATION RATE: 18 BRPM | TEMPERATURE: 98 F

## 2018-03-17 LAB
ALBUMIN SERPL ELPH-MCNC: 4.3 G/DL — SIGNIFICANT CHANGE UP (ref 3.3–5)
ALP SERPL-CCNC: 108 U/L — SIGNIFICANT CHANGE UP (ref 40–120)
ALT FLD-CCNC: 20 U/L — SIGNIFICANT CHANGE UP (ref 4–41)
AMPHET UR-MCNC: NEGATIVE — SIGNIFICANT CHANGE UP
APAP SERPL-MCNC: < 15 UG/ML — LOW (ref 15–25)
APPEARANCE UR: SIGNIFICANT CHANGE UP
AST SERPL-CCNC: 27 U/L — SIGNIFICANT CHANGE UP (ref 4–40)
BARBITURATES MEASUREMENT: NEGATIVE — SIGNIFICANT CHANGE UP
BARBITURATES UR SCN-MCNC: NEGATIVE — SIGNIFICANT CHANGE UP
BASOPHILS # BLD AUTO: 0.05 K/UL — SIGNIFICANT CHANGE UP (ref 0–0.2)
BASOPHILS NFR BLD AUTO: 0.8 % — SIGNIFICANT CHANGE UP (ref 0–2)
BENZODIAZ SERPL-MCNC: NEGATIVE — SIGNIFICANT CHANGE UP
BENZODIAZ UR-MCNC: NEGATIVE — SIGNIFICANT CHANGE UP
BILIRUB SERPL-MCNC: 0.5 MG/DL — SIGNIFICANT CHANGE UP (ref 0.2–1.2)
BILIRUB UR-MCNC: NEGATIVE — SIGNIFICANT CHANGE UP
BLOOD UR QL VISUAL: NEGATIVE — SIGNIFICANT CHANGE UP
BUN SERPL-MCNC: 17 MG/DL — SIGNIFICANT CHANGE UP (ref 7–23)
CALCIUM SERPL-MCNC: 9 MG/DL — SIGNIFICANT CHANGE UP (ref 8.4–10.5)
CANNABINOIDS UR-MCNC: NEGATIVE — SIGNIFICANT CHANGE UP
CHLORIDE SERPL-SCNC: 100 MMOL/L — SIGNIFICANT CHANGE UP (ref 98–107)
CO2 SERPL-SCNC: 23 MMOL/L — SIGNIFICANT CHANGE UP (ref 22–31)
COCAINE METAB.OTHER UR-MCNC: NEGATIVE — SIGNIFICANT CHANGE UP
COLOR SPEC: YELLOW — SIGNIFICANT CHANGE UP
CREAT SERPL-MCNC: 1.16 MG/DL — SIGNIFICANT CHANGE UP (ref 0.5–1.3)
EOSINOPHIL # BLD AUTO: 0.14 K/UL — SIGNIFICANT CHANGE UP (ref 0–0.5)
EOSINOPHIL NFR BLD AUTO: 2.2 % — SIGNIFICANT CHANGE UP (ref 0–6)
ETHANOL BLD-MCNC: < 10 MG/DL — SIGNIFICANT CHANGE UP
GLUCOSE SERPL-MCNC: 184 MG/DL — HIGH (ref 70–99)
GLUCOSE UR-MCNC: NEGATIVE — SIGNIFICANT CHANGE UP
HCT VFR BLD CALC: 44.2 % — SIGNIFICANT CHANGE UP (ref 39–50)
HGB BLD-MCNC: 15 G/DL — SIGNIFICANT CHANGE UP (ref 13–17)
HYALINE CASTS # UR AUTO: SIGNIFICANT CHANGE UP (ref 0–?)
IMM GRANULOCYTES # BLD AUTO: 0.01 # — SIGNIFICANT CHANGE UP
IMM GRANULOCYTES NFR BLD AUTO: 0.2 % — SIGNIFICANT CHANGE UP (ref 0–1.5)
KETONES UR-MCNC: NEGATIVE — SIGNIFICANT CHANGE UP
LEUKOCYTE ESTERASE UR-ACNC: NEGATIVE — SIGNIFICANT CHANGE UP
LYMPHOCYTES # BLD AUTO: 2.47 K/UL — SIGNIFICANT CHANGE UP (ref 1–3.3)
LYMPHOCYTES # BLD AUTO: 38.5 % — SIGNIFICANT CHANGE UP (ref 13–44)
MCHC RBC-ENTMCNC: 31.1 PG — SIGNIFICANT CHANGE UP (ref 27–34)
MCHC RBC-ENTMCNC: 33.9 % — SIGNIFICANT CHANGE UP (ref 32–36)
MCV RBC AUTO: 91.5 FL — SIGNIFICANT CHANGE UP (ref 80–100)
METHADONE UR-MCNC: NEGATIVE — SIGNIFICANT CHANGE UP
MONOCYTES # BLD AUTO: 0.66 K/UL — SIGNIFICANT CHANGE UP (ref 0–0.9)
MONOCYTES NFR BLD AUTO: 10.3 % — SIGNIFICANT CHANGE UP (ref 2–14)
MUCOUS THREADS # UR AUTO: SIGNIFICANT CHANGE UP
NEUTROPHILS # BLD AUTO: 3.09 K/UL — SIGNIFICANT CHANGE UP (ref 1.8–7.4)
NEUTROPHILS NFR BLD AUTO: 48 % — SIGNIFICANT CHANGE UP (ref 43–77)
NITRITE UR-MCNC: NEGATIVE — SIGNIFICANT CHANGE UP
NRBC # FLD: 0 — SIGNIFICANT CHANGE UP
OPIATES UR-MCNC: NEGATIVE — SIGNIFICANT CHANGE UP
OXYCODONE UR-MCNC: NEGATIVE — SIGNIFICANT CHANGE UP
PCP UR-MCNC: NEGATIVE — SIGNIFICANT CHANGE UP
PH UR: 6 — SIGNIFICANT CHANGE UP (ref 4.6–8)
PLATELET # BLD AUTO: 205 K/UL — SIGNIFICANT CHANGE UP (ref 150–400)
PMV BLD: 11.8 FL — SIGNIFICANT CHANGE UP (ref 7–13)
POTASSIUM SERPL-MCNC: 3.3 MMOL/L — LOW (ref 3.5–5.3)
POTASSIUM SERPL-SCNC: 3.3 MMOL/L — LOW (ref 3.5–5.3)
PROT SERPL-MCNC: 7.8 G/DL — SIGNIFICANT CHANGE UP (ref 6–8.3)
PROT UR-MCNC: 100 MG/DL — SIGNIFICANT CHANGE UP
RBC # BLD: 4.83 M/UL — SIGNIFICANT CHANGE UP (ref 4.2–5.8)
RBC # FLD: 12.2 % — SIGNIFICANT CHANGE UP (ref 10.3–14.5)
RBC CASTS # UR COMP ASSIST: SIGNIFICANT CHANGE UP (ref 0–?)
SALICYLATES SERPL-MCNC: < 5 MG/DL — LOW (ref 15–30)
SODIUM SERPL-SCNC: 139 MMOL/L — SIGNIFICANT CHANGE UP (ref 135–145)
SP GR SPEC: 1.03 — SIGNIFICANT CHANGE UP (ref 1–1.04)
TSH SERPL-MCNC: 1.69 UIU/ML — SIGNIFICANT CHANGE UP (ref 0.27–4.2)
UROBILINOGEN FLD QL: NORMAL MG/DL — SIGNIFICANT CHANGE UP
WBC # BLD: 6.42 K/UL — SIGNIFICANT CHANGE UP (ref 3.8–10.5)
WBC # FLD AUTO: 6.42 K/UL — SIGNIFICANT CHANGE UP (ref 3.8–10.5)
WBC UR QL: HIGH (ref 0–?)

## 2018-03-17 PROCEDURE — 99285 EMERGENCY DEPT VISIT HI MDM: CPT

## 2018-03-17 RX ORDER — HALOPERIDOL DECANOATE 100 MG/ML
5 INJECTION INTRAMUSCULAR ONCE
Qty: 0 | Refills: 0 | Status: COMPLETED | OUTPATIENT
Start: 2018-03-17 | End: 2018-03-17

## 2018-03-17 RX ORDER — POTASSIUM CHLORIDE 20 MEQ
20 PACKET (EA) ORAL ONCE
Qty: 0 | Refills: 0 | Status: COMPLETED | OUTPATIENT
Start: 2018-03-17 | End: 2018-03-17

## 2018-03-17 RX ADMIN — HALOPERIDOL DECANOATE 5 MILLIGRAM(S): 100 INJECTION INTRAMUSCULAR at 20:56

## 2018-03-17 RX ADMIN — Medication 2 MILLIGRAM(S): at 20:56

## 2018-03-17 NOTE — ED BEHAVIORAL HEALTH ASSESSMENT NOTE - DESCRIPTION
On arrival, pt agitated, argumentative and paranoid. Patient posed a threat to self and staff and required Ativan 2mg/Haldol 5mg/Benadryl 50 mg IM x 1 for safety at 22:13. none domiciled with mother, unemployed, single, no dependents, previously worked as a  (last worked 2007) On arrival, pt agitated, argumentative and paranoid.  Ativan 2mg/Haldol 5mg one time domiciled with mother, unemployed, single, no dependents, previously worked as a ? (last worked 2007)

## 2018-03-17 NOTE — ED PROVIDER NOTE - OBJECTIVE STATEMENT
51 y/o M hx Schizophrenia, Asthma BIBA w c/o bizarre behaviour secondary to medication non compliance. States " I want  read everyone's minds". Denies falling, punching or kicking any objects. Denies pain, SOB, fever, chills, chest/abdominal discomfort .  Denies SI/HI/AH/VH. Denies  use of alcohol or illicit drugs. No evidence of physical injuries, broken  skin or deformities. 53 y/o M hx Schizophrenia, Asthma BIBA w c/o bizarre behaviour secondary to medication non compliance. States " I want  read everyone's minds". States that his mother was harassing him and following him around. Denies falling, punching or kicking any objects. Denies pain, SOB, fever, chills, chest/abdominal discomfort .  Denies SI/HI/AH/VH. Denies  use of alcohol or illicit drugs. No evidence of physical injuries, broken  skin or deformities. 53 y/o M hx Schizophrenia, Asthma BIBA w c/o bizarre behaviour secondary to medication non compliance. States " I want  read everyone's minds". States that his mother was harassing him and following him around. Denies falling, punching or kicking any objects. Denies pain, SOB, fever, chills, chest/abdominal discomfort .  Denies SI/HI/AH/VH. Denies  use of alcohol or illicit drugs. No evidence of physical injuries, broken  skin or deformities..

## 2018-03-17 NOTE — ED BEHAVIORAL HEALTH ASSESSMENT NOTE - DETAILS
Handoff given to EDUARD Dr. Villalta informed mother EDUARD to be contacted once bed is assigned - no beds at this time Hand off given to EDUARD.

## 2018-03-17 NOTE — ED ADULT TRIAGE NOTE - CHIEF COMPLAINT QUOTE
brought in by EMS from home for non complaint with medication regimen, paranoid and yelling out.  Unable to obtain information from patient, refused vital signs states.  PMH: schizophrenia

## 2018-03-17 NOTE — ED BEHAVIORAL HEALTH ASSESSMENT NOTE - NS ED BHA MED ROS ENDOCRINE
Size Of Lesion In Cm (Optional): 0
Detail Level: Detailed
Detail Level: Zone
Detail Level: Simple
No complaints

## 2018-03-17 NOTE — ED BEHAVIORAL HEALTH ASSESSMENT NOTE - PSYCHIATRIC ISSUES AND PLAN (INCLUDE STANDING AND PRN MEDICATION)
Defer standing medication to inpatient team; can use Haldol 5 mg, Ativan 2 mg, Benadryl 50 mg PO/IM prn agitation Abilify 5 mg daily (PIMENTEL should be considered), Haldol 5 mg, Ativan 2 mg, Benadryl 50 mg PO/IM prn agitation

## 2018-03-17 NOTE — ED ADULT NURSE NOTE - TEMPERATURE IN FAHRENHEIT (DEGREES F)
POST-OP VISIT     SUBJECTIVE:  Monica 97.7

## 2018-03-17 NOTE — ED BEHAVIORAL HEALTH ASSESSMENT NOTE - HPI (INCLUDE ILLNESS QUALITY, SEVERITY, DURATION, TIMING, CONTEXT, MODIFYING FACTORS, ASSOCIATED SIGNS AND SYMPTOMS)
52 year old AA male, single, non-caregiver, unemployed, domiciled with mother in a private house in [place], prior psychiatric history of schizophrenia, no prior medical issues or history of substance abuse, BIBEMS activated by mother for worsening paranoia and agitation in context of medication noncompliance.       On arrival, pt agitated, argumentative and paranoid. Patient posed a threat to self and staff and required Ativan 2mg/Haldol 5mg/Benadryl 50 mg IM x 1 for safety at 22:13.    See  note for collateral obtained from mother. In summary, pt has been increasingly paranoid, verbally aggressive, threatening, agitated, as well as responding to internal stimuli.     Pt was sedated throughout the night due to prn medication. He was interviewed in the morning when he was awake and alert. On assessment, pt states his mother was harassing him and "swinging her arms" at him. He doesn't know why EMS brought him to the hospital, as he states he doesn't have any psychiatric problems. He repeatedly states "I don't have any problems, my mother has psychiatric problems." He states she has been harassing him for years. He denies being agitated or threatening her. He denies being agitated upon arrival to  ED and doesn't know why he was medicated. He denies all psychiatric symptoms. Denies depressed mood or anhedonia, denies psychotic or manic symptoms. He denies SI/HI. States he hasn't taken Abilify in a year or so because he doesn't need psychiatric medication. Denies substance use.      Verbally abusive, "coming at me", said "stay away from me", noncompliant since Nov 2017, Abilifty was helpful, father passed 2012 and destabilized after that, last few months; more aggressive, said "get in the room", 52 year old AA male, single, non-caregiver, unemployed, domiciled with mother in a private house in [place], prior psychiatric history of schizophrenia, noncompliance, no prior medical issues or history of substance abuse, BIBEMS activated by mother as he has been more verbally aggressive, intimidating, and paranoid.     On arrival, pt agitated, argumentative and paranoid. required Ativan 2mg/Haldol 5mg one time and then settled down. Pt says, "My mom is harassing me and you have to get the  to read her thoughts." Says, "I was doing anything and the police showed up." Says, "You have to get the  to come and read my thoughts. I don't have a problem, the  needs to read her thoughts to see." Pt then asked to clarify what he means by "read her thoughts" and looks puzzled that writer does not know what that means. Pt says, "The lasers can read the thoughts. You don't know that?" Pt says he has not been on meds recently and "I don't need meds for anything." Denies any hx of psychiatric d/o. Denies any medical issues. Denies SI/HI at this time, currently stable in ED after being medicated.  Pt denies AH/VH.     As per mother he is "Verbally abusive, "coming at me", said "stay away from me", noncompliant since Nov 2017, Abilify was helpful but he remains noncompliant. Says father passed 2012 and destabilized after that, last few months; more aggressive, has not been physical but has been coming in mother's room and trying to intimidate her.

## 2018-03-17 NOTE — ED BEHAVIORAL HEALTH ASSESSMENT NOTE - OTHER PAST PSYCHIATRIC HISTORY (INCLUDE DETAILS REGARDING ONSET, COURSE OF ILLNESS, INPATIENT/OUTPATIENT TREATMENT)
One prior admission at [facility] in 1990s when he locked himself in the apartment and refused to leave. No documented or reported hx of suicide attempt or aggression.  In [facility] from 2/20/2001 from 11/21/2014 prior admission at [facility] in 1990s when he locked himself in the apartment and refused to leave. No documented or reported hx of suicide attempt or aggression.  In [facility] from 2/20/2001 from 11/21/2014  Yasmine Stringer 2017 - did not f/u with outpt

## 2018-03-17 NOTE — ED PROVIDER NOTE - MEDICAL DECISION MAKING DETAILS
51 y/o M hx Schizophrenia, Asthma   Labs, Urine Tox/UA, EKG.   Medical evaluation performed. There is no clinical evidence of intoxication or any acute medical problem requiring immediate intervention. Patient is awaiting psychiatric consultation. Final disposition will be determined by psychiatrist.

## 2018-03-17 NOTE — ED BEHAVIORAL HEALTH ASSESSMENT NOTE - SUMMARY
52 year old AA male, single, non-caregiver, unemployed, domiciled with mother in a private house in [place], prior psychiatric history of schizophrenia, no prior medical issues or history of substance abuse, BIBEMS activated by mother for worsening paranoia and agitation in context of medication noncompliance.       On arrival, pt was agitated and paranoid, requiring prn medications. Per mother, pt has been increasingly paranoid, verbally aggressive, threatening, agitated, as well as responding to internal stimuli.   Pt has no insight into his mental illness. Pt presents an acute danger to others and requires inpatient psychiatric admission for safety and stabilization. 52 year old AA male, single, non-caregiver, unemployed, domiciled with mother in a private house in [place], prior psychiatric history of schizophrenia, noncompliance, no prior medical issues or history of substance abuse, BIBEMS activated by mother as he has been more verbally aggressive, intimidating, and paranoid.     Pt currently unstable, required medication as per hpi, speaking of wanting people to read his thoughts, remains illogical, has no insight, has recently been more aggressive at home towards mother. Needs admission for continued stabilization. Has been noncompliant as per mother since discharge in Nov 2017.

## 2018-03-18 DIAGNOSIS — F20.9 SCHIZOPHRENIA, UNSPECIFIED: ICD-10-CM

## 2018-03-18 PROCEDURE — 99222 1ST HOSP IP/OBS MODERATE 55: CPT

## 2018-03-18 RX ORDER — DIPHENHYDRAMINE HCL 50 MG
50 CAPSULE ORAL EVERY 8 HOURS
Qty: 0 | Refills: 0 | Status: DISCONTINUED | OUTPATIENT
Start: 2018-03-18 | End: 2018-03-18

## 2018-03-18 RX ORDER — TRAZODONE HCL 50 MG
50 TABLET ORAL AT BEDTIME
Qty: 0 | Refills: 0 | Status: DISCONTINUED | OUTPATIENT
Start: 2018-03-18 | End: 2018-07-06

## 2018-03-18 RX ORDER — ARIPIPRAZOLE 15 MG/1
5 TABLET ORAL DAILY
Qty: 0 | Refills: 0 | Status: DISCONTINUED | OUTPATIENT
Start: 2018-03-18 | End: 2018-03-18

## 2018-03-18 RX ORDER — HALOPERIDOL DECANOATE 100 MG/ML
5 INJECTION INTRAMUSCULAR EVERY 8 HOURS
Qty: 0 | Refills: 0 | Status: DISCONTINUED | OUTPATIENT
Start: 2018-03-18 | End: 2018-03-18

## 2018-03-18 RX ORDER — HALOPERIDOL DECANOATE 100 MG/ML
5 INJECTION INTRAMUSCULAR EVERY 6 HOURS
Qty: 0 | Refills: 0 | Status: DISCONTINUED | OUTPATIENT
Start: 2018-03-18 | End: 2018-07-06

## 2018-03-18 RX ORDER — DIPHENHYDRAMINE HCL 50 MG
50 CAPSULE ORAL EVERY 6 HOURS
Qty: 0 | Refills: 0 | Status: DISCONTINUED | OUTPATIENT
Start: 2018-03-18 | End: 2018-07-06

## 2018-03-18 RX ORDER — DIPHENHYDRAMINE HCL 50 MG
25 CAPSULE ORAL EVERY 6 HOURS
Qty: 0 | Refills: 0 | Status: DISCONTINUED | OUTPATIENT
Start: 2018-03-18 | End: 2018-07-06

## 2018-03-18 RX ORDER — ACETAMINOPHEN 500 MG
650 TABLET ORAL EVERY 6 HOURS
Qty: 0 | Refills: 0 | Status: DISCONTINUED | OUTPATIENT
Start: 2018-03-18 | End: 2018-07-06

## 2018-03-18 RX ORDER — ARIPIPRAZOLE 15 MG/1
5 TABLET ORAL DAILY
Qty: 0 | Refills: 0 | Status: DISCONTINUED | OUTPATIENT
Start: 2018-03-18 | End: 2018-03-21

## 2018-03-18 RX ADMIN — Medication 20 MILLIEQUIVALENT(S): at 02:24

## 2018-03-18 NOTE — ED ADULT NURSE REASSESSMENT NOTE - NS ED NURSE REASSESS COMMENT FT1
Pt sleeping; easily aroused. Pts reassessment vitals as noted. Pt given K 20meq as ordered. Pt awaiting EMS for transport to [facility].

## 2018-03-19 PROCEDURE — 99232 SBSQ HOSP IP/OBS MODERATE 35: CPT

## 2018-03-19 NOTE — ED BEHAVIORAL HEALTH NOTE - BEHAVIORAL HEALTH NOTE
Precertification was received for patient Rocky Dorsey MRN #[id number].  Worker Spoke to Mariano at [facility] who states that accepted days is 3/17/18-3/23/18 with review with Carly (no contact number given) with authorization # [id number].

## 2018-03-20 PROCEDURE — 99232 SBSQ HOSP IP/OBS MODERATE 35: CPT

## 2018-03-21 PROCEDURE — 99232 SBSQ HOSP IP/OBS MODERATE 35: CPT

## 2018-03-21 RX ORDER — RISPERIDONE 4 MG/1
1 TABLET ORAL
Qty: 0 | Refills: 0 | Status: DISCONTINUED | OUTPATIENT
Start: 2018-03-21 | End: 2018-04-03

## 2018-03-22 PROCEDURE — 99232 SBSQ HOSP IP/OBS MODERATE 35: CPT

## 2018-03-23 PROCEDURE — 99232 SBSQ HOSP IP/OBS MODERATE 35: CPT

## 2018-03-26 PROCEDURE — 99232 SBSQ HOSP IP/OBS MODERATE 35: CPT

## 2018-03-27 PROCEDURE — 99231 SBSQ HOSP IP/OBS SF/LOW 25: CPT

## 2018-03-28 PROCEDURE — 99231 SBSQ HOSP IP/OBS SF/LOW 25: CPT

## 2018-03-30 PROCEDURE — 99232 SBSQ HOSP IP/OBS MODERATE 35: CPT

## 2018-04-02 PROCEDURE — 99232 SBSQ HOSP IP/OBS MODERATE 35: CPT

## 2018-04-03 PROCEDURE — 99232 SBSQ HOSP IP/OBS MODERATE 35: CPT

## 2018-04-03 RX ORDER — RISPERIDONE 4 MG/1
1 TABLET ORAL
Qty: 0 | Refills: 0 | Status: DISCONTINUED | OUTPATIENT
Start: 2018-04-03 | End: 2018-04-04

## 2018-04-03 RX ORDER — HALOPERIDOL DECANOATE 100 MG/ML
5 INJECTION INTRAMUSCULAR ONCE
Qty: 0 | Refills: 0 | Status: COMPLETED | OUTPATIENT
Start: 2018-04-03 | End: 2018-04-03

## 2018-04-03 RX ORDER — RISPERIDONE 4 MG/1
1 TABLET ORAL ONCE
Qty: 0 | Refills: 0 | Status: COMPLETED | OUTPATIENT
Start: 2018-04-03 | End: 2018-04-03

## 2018-04-03 RX ORDER — HALOPERIDOL DECANOATE 100 MG/ML
5 INJECTION INTRAMUSCULAR EVERY 12 HOURS
Qty: 0 | Refills: 0 | Status: DISCONTINUED | OUTPATIENT
Start: 2018-04-03 | End: 2018-07-06

## 2018-04-03 RX ORDER — DIPHENHYDRAMINE HCL 50 MG
50 CAPSULE ORAL ONCE
Qty: 0 | Refills: 0 | Status: COMPLETED | OUTPATIENT
Start: 2018-04-03 | End: 2018-04-03

## 2018-04-03 RX ORDER — DIPHENHYDRAMINE HCL 50 MG
50 CAPSULE ORAL EVERY 12 HOURS
Qty: 0 | Refills: 0 | Status: DISCONTINUED | OUTPATIENT
Start: 2018-04-03 | End: 2018-07-06

## 2018-04-03 RX ADMIN — Medication 50 MILLIGRAM(S): at 15:24

## 2018-04-03 RX ADMIN — RISPERIDONE 1 MILLIGRAM(S): 4 TABLET ORAL at 22:15

## 2018-04-03 RX ADMIN — HALOPERIDOL DECANOATE 5 MILLIGRAM(S): 100 INJECTION INTRAMUSCULAR at 15:25

## 2018-04-04 PROCEDURE — 99232 SBSQ HOSP IP/OBS MODERATE 35: CPT

## 2018-04-04 RX ORDER — RISPERIDONE 4 MG/1
2 TABLET ORAL
Qty: 0 | Refills: 0 | Status: DISCONTINUED | OUTPATIENT
Start: 2018-04-04 | End: 2018-04-09

## 2018-04-04 RX ADMIN — RISPERIDONE 2 MILLIGRAM(S): 4 TABLET ORAL at 21:03

## 2018-04-05 PROCEDURE — 99232 SBSQ HOSP IP/OBS MODERATE 35: CPT

## 2018-04-05 RX ADMIN — RISPERIDONE 2 MILLIGRAM(S): 4 TABLET ORAL at 09:32

## 2018-04-05 RX ADMIN — RISPERIDONE 2 MILLIGRAM(S): 4 TABLET ORAL at 20:33

## 2018-04-06 PROCEDURE — 99232 SBSQ HOSP IP/OBS MODERATE 35: CPT

## 2018-04-06 RX ADMIN — RISPERIDONE 2 MILLIGRAM(S): 4 TABLET ORAL at 10:33

## 2018-04-06 RX ADMIN — RISPERIDONE 2 MILLIGRAM(S): 4 TABLET ORAL at 20:31

## 2018-04-07 RX ADMIN — RISPERIDONE 2 MILLIGRAM(S): 4 TABLET ORAL at 09:32

## 2018-04-07 RX ADMIN — RISPERIDONE 2 MILLIGRAM(S): 4 TABLET ORAL at 20:30

## 2018-04-08 RX ADMIN — RISPERIDONE 2 MILLIGRAM(S): 4 TABLET ORAL at 20:16

## 2018-04-08 RX ADMIN — RISPERIDONE 2 MILLIGRAM(S): 4 TABLET ORAL at 10:35

## 2018-04-09 PROCEDURE — 99232 SBSQ HOSP IP/OBS MODERATE 35: CPT

## 2018-04-09 RX ORDER — RISPERIDONE 4 MG/1
3 TABLET ORAL
Qty: 0 | Refills: 0 | Status: DISCONTINUED | OUTPATIENT
Start: 2018-04-09 | End: 2018-04-12

## 2018-04-09 RX ADMIN — RISPERIDONE 3 MILLIGRAM(S): 4 TABLET ORAL at 21:16

## 2018-04-09 RX ADMIN — RISPERIDONE 2 MILLIGRAM(S): 4 TABLET ORAL at 08:35

## 2018-04-10 PROCEDURE — 99232 SBSQ HOSP IP/OBS MODERATE 35: CPT

## 2018-04-10 RX ADMIN — RISPERIDONE 3 MILLIGRAM(S): 4 TABLET ORAL at 21:50

## 2018-04-11 PROCEDURE — 99232 SBSQ HOSP IP/OBS MODERATE 35: CPT

## 2018-04-11 RX ADMIN — RISPERIDONE 3 MILLIGRAM(S): 4 TABLET ORAL at 08:50

## 2018-04-11 RX ADMIN — RISPERIDONE 3 MILLIGRAM(S): 4 TABLET ORAL at 20:54

## 2018-04-12 PROCEDURE — 99232 SBSQ HOSP IP/OBS MODERATE 35: CPT

## 2018-04-12 RX ORDER — RISPERIDONE 4 MG/1
4 TABLET ORAL
Qty: 0 | Refills: 0 | Status: DISCONTINUED | OUTPATIENT
Start: 2018-04-12 | End: 2018-04-23

## 2018-04-12 RX ADMIN — RISPERIDONE 3 MILLIGRAM(S): 4 TABLET ORAL at 08:34

## 2018-04-12 RX ADMIN — RISPERIDONE 4 MILLIGRAM(S): 4 TABLET ORAL at 20:44

## 2018-04-13 PROCEDURE — 99232 SBSQ HOSP IP/OBS MODERATE 35: CPT

## 2018-04-13 RX ADMIN — RISPERIDONE 4 MILLIGRAM(S): 4 TABLET ORAL at 20:45

## 2018-04-13 RX ADMIN — RISPERIDONE 4 MILLIGRAM(S): 4 TABLET ORAL at 08:49

## 2018-04-14 RX ADMIN — RISPERIDONE 4 MILLIGRAM(S): 4 TABLET ORAL at 21:06

## 2018-04-14 RX ADMIN — RISPERIDONE 4 MILLIGRAM(S): 4 TABLET ORAL at 09:02

## 2018-04-15 RX ADMIN — RISPERIDONE 4 MILLIGRAM(S): 4 TABLET ORAL at 09:00

## 2018-04-15 RX ADMIN — RISPERIDONE 4 MILLIGRAM(S): 4 TABLET ORAL at 20:39

## 2018-04-16 PROCEDURE — 99232 SBSQ HOSP IP/OBS MODERATE 35: CPT

## 2018-04-16 RX ADMIN — RISPERIDONE 4 MILLIGRAM(S): 4 TABLET ORAL at 20:15

## 2018-04-16 RX ADMIN — RISPERIDONE 4 MILLIGRAM(S): 4 TABLET ORAL at 09:07

## 2018-04-17 PROCEDURE — 99232 SBSQ HOSP IP/OBS MODERATE 35: CPT

## 2018-04-17 RX ORDER — DIPHENHYDRAMINE HCL 50 MG
50 CAPSULE ORAL ONCE
Qty: 0 | Refills: 0 | Status: DISCONTINUED | OUTPATIENT
Start: 2018-04-17 | End: 2018-07-06

## 2018-04-17 RX ORDER — HALOPERIDOL DECANOATE 100 MG/ML
5 INJECTION INTRAMUSCULAR ONCE
Qty: 0 | Refills: 0 | Status: DISCONTINUED | OUTPATIENT
Start: 2018-04-17 | End: 2018-07-06

## 2018-04-17 RX ORDER — ARIPIPRAZOLE 15 MG/1
10 TABLET ORAL DAILY
Qty: 0 | Refills: 0 | Status: DISCONTINUED | OUTPATIENT
Start: 2018-04-18 | End: 2018-04-18

## 2018-04-17 RX ORDER — ARIPIPRAZOLE 15 MG/1
10 TABLET ORAL ONCE
Qty: 0 | Refills: 0 | Status: COMPLETED | OUTPATIENT
Start: 2018-04-17 | End: 2018-04-17

## 2018-04-17 RX ADMIN — RISPERIDONE 4 MILLIGRAM(S): 4 TABLET ORAL at 21:05

## 2018-04-17 RX ADMIN — ARIPIPRAZOLE 10 MILLIGRAM(S): 15 TABLET ORAL at 14:21

## 2018-04-17 RX ADMIN — RISPERIDONE 4 MILLIGRAM(S): 4 TABLET ORAL at 08:21

## 2018-04-18 PROCEDURE — 99232 SBSQ HOSP IP/OBS MODERATE 35: CPT

## 2018-04-18 RX ORDER — ARIPIPRAZOLE 15 MG/1
15 TABLET ORAL DAILY
Qty: 0 | Refills: 0 | Status: DISCONTINUED | OUTPATIENT
Start: 2018-04-19 | End: 2018-04-20

## 2018-04-18 RX ADMIN — RISPERIDONE 4 MILLIGRAM(S): 4 TABLET ORAL at 09:03

## 2018-04-18 RX ADMIN — ARIPIPRAZOLE 10 MILLIGRAM(S): 15 TABLET ORAL at 09:03

## 2018-04-18 RX ADMIN — RISPERIDONE 4 MILLIGRAM(S): 4 TABLET ORAL at 21:31

## 2018-04-19 PROCEDURE — 99232 SBSQ HOSP IP/OBS MODERATE 35: CPT

## 2018-04-19 RX ADMIN — RISPERIDONE 4 MILLIGRAM(S): 4 TABLET ORAL at 20:50

## 2018-04-19 RX ADMIN — ARIPIPRAZOLE 15 MILLIGRAM(S): 15 TABLET ORAL at 08:32

## 2018-04-19 RX ADMIN — RISPERIDONE 4 MILLIGRAM(S): 4 TABLET ORAL at 08:32

## 2018-04-20 PROCEDURE — 99232 SBSQ HOSP IP/OBS MODERATE 35: CPT

## 2018-04-20 RX ORDER — ARIPIPRAZOLE 15 MG/1
20 TABLET ORAL DAILY
Qty: 0 | Refills: 0 | Status: DISCONTINUED | OUTPATIENT
Start: 2018-04-21 | End: 2018-04-23

## 2018-04-20 RX ADMIN — RISPERIDONE 4 MILLIGRAM(S): 4 TABLET ORAL at 21:39

## 2018-04-20 RX ADMIN — RISPERIDONE 4 MILLIGRAM(S): 4 TABLET ORAL at 08:51

## 2018-04-20 RX ADMIN — ARIPIPRAZOLE 15 MILLIGRAM(S): 15 TABLET ORAL at 08:51

## 2018-04-21 RX ADMIN — RISPERIDONE 4 MILLIGRAM(S): 4 TABLET ORAL at 09:23

## 2018-04-21 RX ADMIN — RISPERIDONE 4 MILLIGRAM(S): 4 TABLET ORAL at 21:47

## 2018-04-21 RX ADMIN — ARIPIPRAZOLE 20 MILLIGRAM(S): 15 TABLET ORAL at 09:23

## 2018-04-22 RX ADMIN — RISPERIDONE 4 MILLIGRAM(S): 4 TABLET ORAL at 20:15

## 2018-04-22 RX ADMIN — RISPERIDONE 4 MILLIGRAM(S): 4 TABLET ORAL at 09:09

## 2018-04-22 RX ADMIN — ARIPIPRAZOLE 20 MILLIGRAM(S): 15 TABLET ORAL at 09:09

## 2018-04-23 PROCEDURE — 99232 SBSQ HOSP IP/OBS MODERATE 35: CPT

## 2018-04-23 RX ORDER — ARIPIPRAZOLE 15 MG/1
25 TABLET ORAL DAILY
Qty: 0 | Refills: 0 | Status: DISCONTINUED | OUTPATIENT
Start: 2018-04-23 | End: 2018-04-25

## 2018-04-23 RX ORDER — RISPERIDONE 4 MG/1
3 TABLET ORAL
Qty: 0 | Refills: 0 | Status: DISCONTINUED | OUTPATIENT
Start: 2018-04-23 | End: 2018-04-30

## 2018-04-23 RX ADMIN — ARIPIPRAZOLE 20 MILLIGRAM(S): 15 TABLET ORAL at 08:25

## 2018-04-23 RX ADMIN — RISPERIDONE 3 MILLIGRAM(S): 4 TABLET ORAL at 20:21

## 2018-04-23 RX ADMIN — RISPERIDONE 4 MILLIGRAM(S): 4 TABLET ORAL at 08:25

## 2018-04-24 PROCEDURE — 99232 SBSQ HOSP IP/OBS MODERATE 35: CPT

## 2018-04-24 RX ADMIN — RISPERIDONE 3 MILLIGRAM(S): 4 TABLET ORAL at 20:58

## 2018-04-24 RX ADMIN — ARIPIPRAZOLE 25 MILLIGRAM(S): 15 TABLET ORAL at 08:30

## 2018-04-24 RX ADMIN — RISPERIDONE 3 MILLIGRAM(S): 4 TABLET ORAL at 08:30

## 2018-04-25 PROCEDURE — 99232 SBSQ HOSP IP/OBS MODERATE 35: CPT

## 2018-04-25 RX ORDER — ARIPIPRAZOLE 15 MG/1
30 TABLET ORAL DAILY
Qty: 0 | Refills: 0 | Status: DISCONTINUED | OUTPATIENT
Start: 2018-04-26 | End: 2018-05-09

## 2018-04-25 RX ADMIN — ARIPIPRAZOLE 25 MILLIGRAM(S): 15 TABLET ORAL at 08:21

## 2018-04-25 RX ADMIN — RISPERIDONE 3 MILLIGRAM(S): 4 TABLET ORAL at 08:21

## 2018-04-25 RX ADMIN — RISPERIDONE 3 MILLIGRAM(S): 4 TABLET ORAL at 20:26

## 2018-04-26 PROCEDURE — 99231 SBSQ HOSP IP/OBS SF/LOW 25: CPT

## 2018-04-26 RX ADMIN — RISPERIDONE 3 MILLIGRAM(S): 4 TABLET ORAL at 08:31

## 2018-04-26 RX ADMIN — RISPERIDONE 3 MILLIGRAM(S): 4 TABLET ORAL at 22:12

## 2018-04-26 RX ADMIN — ARIPIPRAZOLE 30 MILLIGRAM(S): 15 TABLET ORAL at 08:31

## 2018-04-27 PROCEDURE — 99231 SBSQ HOSP IP/OBS SF/LOW 25: CPT

## 2018-04-27 RX ADMIN — RISPERIDONE 3 MILLIGRAM(S): 4 TABLET ORAL at 09:18

## 2018-04-27 RX ADMIN — RISPERIDONE 3 MILLIGRAM(S): 4 TABLET ORAL at 21:09

## 2018-04-27 RX ADMIN — ARIPIPRAZOLE 30 MILLIGRAM(S): 15 TABLET ORAL at 09:18

## 2018-04-28 RX ADMIN — ARIPIPRAZOLE 30 MILLIGRAM(S): 15 TABLET ORAL at 08:46

## 2018-04-28 RX ADMIN — RISPERIDONE 3 MILLIGRAM(S): 4 TABLET ORAL at 21:01

## 2018-04-28 RX ADMIN — RISPERIDONE 3 MILLIGRAM(S): 4 TABLET ORAL at 08:46

## 2018-04-29 RX ADMIN — ARIPIPRAZOLE 30 MILLIGRAM(S): 15 TABLET ORAL at 09:24

## 2018-04-29 RX ADMIN — RISPERIDONE 3 MILLIGRAM(S): 4 TABLET ORAL at 09:24

## 2018-04-29 RX ADMIN — RISPERIDONE 3 MILLIGRAM(S): 4 TABLET ORAL at 20:46

## 2018-04-30 PROCEDURE — 99231 SBSQ HOSP IP/OBS SF/LOW 25: CPT

## 2018-04-30 RX ORDER — RISPERIDONE 4 MG/1
2 TABLET ORAL
Qty: 0 | Refills: 0 | Status: DISCONTINUED | OUTPATIENT
Start: 2018-04-30 | End: 2018-04-30

## 2018-04-30 RX ORDER — RISPERIDONE 4 MG/1
2 TABLET ORAL
Qty: 0 | Refills: 0 | Status: DISCONTINUED | OUTPATIENT
Start: 2018-04-30 | End: 2018-05-01

## 2018-04-30 RX ADMIN — RISPERIDONE 3 MILLIGRAM(S): 4 TABLET ORAL at 08:13

## 2018-04-30 RX ADMIN — ARIPIPRAZOLE 30 MILLIGRAM(S): 15 TABLET ORAL at 08:13

## 2018-04-30 RX ADMIN — RISPERIDONE 2 MILLIGRAM(S): 4 TABLET ORAL at 21:04

## 2018-05-01 PROCEDURE — 99232 SBSQ HOSP IP/OBS MODERATE 35: CPT

## 2018-05-01 RX ORDER — RISPERIDONE 4 MG/1
1 TABLET ORAL
Qty: 0 | Refills: 0 | Status: DISCONTINUED | OUTPATIENT
Start: 2018-05-01 | End: 2018-05-02

## 2018-05-01 RX ADMIN — RISPERIDONE 2 MILLIGRAM(S): 4 TABLET ORAL at 10:09

## 2018-05-01 RX ADMIN — ARIPIPRAZOLE 30 MILLIGRAM(S): 15 TABLET ORAL at 10:09

## 2018-05-01 RX ADMIN — RISPERIDONE 1 MILLIGRAM(S): 4 TABLET ORAL at 21:10

## 2018-05-02 PROCEDURE — 99232 SBSQ HOSP IP/OBS MODERATE 35: CPT

## 2018-05-02 RX ADMIN — RISPERIDONE 1 MILLIGRAM(S): 4 TABLET ORAL at 09:08

## 2018-05-02 RX ADMIN — ARIPIPRAZOLE 30 MILLIGRAM(S): 15 TABLET ORAL at 09:08

## 2018-05-03 PROCEDURE — 99232 SBSQ HOSP IP/OBS MODERATE 35: CPT

## 2018-05-03 RX ORDER — OLANZAPINE 15 MG/1
5 TABLET, FILM COATED ORAL AT BEDTIME
Qty: 0 | Refills: 0 | Status: DISCONTINUED | OUTPATIENT
Start: 2018-05-03 | End: 2018-05-04

## 2018-05-03 RX ADMIN — OLANZAPINE 5 MILLIGRAM(S): 15 TABLET, FILM COATED ORAL at 20:32

## 2018-05-03 RX ADMIN — ARIPIPRAZOLE 30 MILLIGRAM(S): 15 TABLET ORAL at 11:06

## 2018-05-04 PROCEDURE — 99231 SBSQ HOSP IP/OBS SF/LOW 25: CPT

## 2018-05-04 RX ORDER — OLANZAPINE 15 MG/1
10 TABLET, FILM COATED ORAL AT BEDTIME
Qty: 0 | Refills: 0 | Status: DISCONTINUED | OUTPATIENT
Start: 2018-05-04 | End: 2018-05-06

## 2018-05-04 RX ADMIN — OLANZAPINE 10 MILLIGRAM(S): 15 TABLET, FILM COATED ORAL at 20:16

## 2018-05-04 RX ADMIN — ARIPIPRAZOLE 30 MILLIGRAM(S): 15 TABLET ORAL at 09:11

## 2018-05-05 RX ADMIN — OLANZAPINE 10 MILLIGRAM(S): 15 TABLET, FILM COATED ORAL at 20:27

## 2018-05-05 RX ADMIN — ARIPIPRAZOLE 30 MILLIGRAM(S): 15 TABLET ORAL at 08:44

## 2018-05-06 RX ORDER — OLANZAPINE 15 MG/1
15 TABLET, FILM COATED ORAL AT BEDTIME
Qty: 0 | Refills: 0 | Status: DISCONTINUED | OUTPATIENT
Start: 2018-05-06 | End: 2018-05-07

## 2018-05-06 RX ADMIN — ARIPIPRAZOLE 30 MILLIGRAM(S): 15 TABLET ORAL at 08:54

## 2018-05-06 RX ADMIN — OLANZAPINE 15 MILLIGRAM(S): 15 TABLET, FILM COATED ORAL at 20:36

## 2018-05-07 LAB
M TB TUBERC IFN-G BLD QL: 0 IU/ML — SIGNIFICANT CHANGE UP
M TB TUBERC IFN-G BLD QL: 0.02 IU/ML — SIGNIFICANT CHANGE UP
M TB TUBERC IFN-G BLD QL: NEGATIVE — SIGNIFICANT CHANGE UP
MITOGEN IGNF BCKGRD COR BLD-ACNC: >10 IU/ML — SIGNIFICANT CHANGE UP

## 2018-05-07 PROCEDURE — 99231 SBSQ HOSP IP/OBS SF/LOW 25: CPT

## 2018-05-07 RX ORDER — OLANZAPINE 15 MG/1
20 TABLET, FILM COATED ORAL AT BEDTIME
Qty: 0 | Refills: 0 | Status: DISCONTINUED | OUTPATIENT
Start: 2018-05-07 | End: 2018-05-09

## 2018-05-07 RX ADMIN — ARIPIPRAZOLE 30 MILLIGRAM(S): 15 TABLET ORAL at 08:35

## 2018-05-07 RX ADMIN — OLANZAPINE 20 MILLIGRAM(S): 15 TABLET, FILM COATED ORAL at 21:41

## 2018-05-08 PROCEDURE — 99231 SBSQ HOSP IP/OBS SF/LOW 25: CPT

## 2018-05-08 RX ADMIN — ARIPIPRAZOLE 30 MILLIGRAM(S): 15 TABLET ORAL at 09:06

## 2018-05-08 RX ADMIN — OLANZAPINE 20 MILLIGRAM(S): 15 TABLET, FILM COATED ORAL at 20:48

## 2018-05-09 PROCEDURE — 99231 SBSQ HOSP IP/OBS SF/LOW 25: CPT

## 2018-05-09 PROCEDURE — 93010 ELECTROCARDIOGRAM REPORT: CPT

## 2018-05-09 RX ORDER — OLANZAPINE 15 MG/1
25 TABLET, FILM COATED ORAL AT BEDTIME
Qty: 0 | Refills: 0 | Status: DISCONTINUED | OUTPATIENT
Start: 2018-05-09 | End: 2018-05-10

## 2018-05-09 RX ORDER — ARIPIPRAZOLE 15 MG/1
20 TABLET ORAL DAILY
Qty: 0 | Refills: 0 | Status: DISCONTINUED | OUTPATIENT
Start: 2018-05-10 | End: 2018-05-10

## 2018-05-09 RX ADMIN — OLANZAPINE 25 MILLIGRAM(S): 15 TABLET, FILM COATED ORAL at 20:41

## 2018-05-09 RX ADMIN — ARIPIPRAZOLE 30 MILLIGRAM(S): 15 TABLET ORAL at 08:44

## 2018-05-10 PROCEDURE — 99231 SBSQ HOSP IP/OBS SF/LOW 25: CPT

## 2018-05-10 RX ORDER — ARIPIPRAZOLE 15 MG/1
10 TABLET ORAL DAILY
Qty: 0 | Refills: 0 | Status: DISCONTINUED | OUTPATIENT
Start: 2018-05-11 | End: 2018-05-11

## 2018-05-10 RX ORDER — OLANZAPINE 15 MG/1
30 TABLET, FILM COATED ORAL AT BEDTIME
Qty: 0 | Refills: 0 | Status: DISCONTINUED | OUTPATIENT
Start: 2018-05-10 | End: 2018-05-11

## 2018-05-10 RX ADMIN — ARIPIPRAZOLE 20 MILLIGRAM(S): 15 TABLET ORAL at 09:06

## 2018-05-10 RX ADMIN — OLANZAPINE 30 MILLIGRAM(S): 15 TABLET, FILM COATED ORAL at 20:26

## 2018-05-11 PROCEDURE — 99231 SBSQ HOSP IP/OBS SF/LOW 25: CPT

## 2018-05-11 RX ORDER — OLANZAPINE 15 MG/1
40 TABLET, FILM COATED ORAL AT BEDTIME
Qty: 0 | Refills: 0 | Status: DISCONTINUED | OUTPATIENT
Start: 2018-05-11 | End: 2018-06-05

## 2018-05-11 RX ADMIN — OLANZAPINE 40 MILLIGRAM(S): 15 TABLET, FILM COATED ORAL at 21:33

## 2018-05-11 RX ADMIN — ARIPIPRAZOLE 10 MILLIGRAM(S): 15 TABLET ORAL at 08:40

## 2018-05-12 RX ADMIN — OLANZAPINE 40 MILLIGRAM(S): 15 TABLET, FILM COATED ORAL at 20:36

## 2018-05-13 RX ADMIN — OLANZAPINE 40 MILLIGRAM(S): 15 TABLET, FILM COATED ORAL at 21:46

## 2018-05-14 PROCEDURE — 99231 SBSQ HOSP IP/OBS SF/LOW 25: CPT

## 2018-05-14 RX ORDER — METOPROLOL TARTRATE 50 MG
12.5 TABLET ORAL
Qty: 0 | Refills: 0 | Status: DISCONTINUED | OUTPATIENT
Start: 2018-05-14 | End: 2018-07-06

## 2018-05-14 RX ADMIN — Medication 12.5 MILLIGRAM(S): at 21:21

## 2018-05-14 RX ADMIN — OLANZAPINE 40 MILLIGRAM(S): 15 TABLET, FILM COATED ORAL at 21:21

## 2018-05-15 PROCEDURE — 99231 SBSQ HOSP IP/OBS SF/LOW 25: CPT

## 2018-05-15 RX ADMIN — OLANZAPINE 40 MILLIGRAM(S): 15 TABLET, FILM COATED ORAL at 21:29

## 2018-05-15 RX ADMIN — Medication 12.5 MILLIGRAM(S): at 21:29

## 2018-05-16 PROCEDURE — 99231 SBSQ HOSP IP/OBS SF/LOW 25: CPT

## 2018-05-16 RX ADMIN — OLANZAPINE 40 MILLIGRAM(S): 15 TABLET, FILM COATED ORAL at 20:49

## 2018-05-16 RX ADMIN — Medication 12.5 MILLIGRAM(S): at 08:32

## 2018-05-16 RX ADMIN — Medication 12.5 MILLIGRAM(S): at 20:49

## 2018-05-17 PROBLEM — Z00.00 ENCOUNTER FOR PREVENTIVE HEALTH EXAMINATION: Status: ACTIVE | Noted: 2018-05-17

## 2018-05-17 PROCEDURE — 99231 SBSQ HOSP IP/OBS SF/LOW 25: CPT

## 2018-05-17 RX ADMIN — OLANZAPINE 40 MILLIGRAM(S): 15 TABLET, FILM COATED ORAL at 21:14

## 2018-05-17 RX ADMIN — Medication 12.5 MILLIGRAM(S): at 21:14

## 2018-05-17 RX ADMIN — Medication 12.5 MILLIGRAM(S): at 08:53

## 2018-05-18 PROCEDURE — 99231 SBSQ HOSP IP/OBS SF/LOW 25: CPT

## 2018-05-18 RX ADMIN — Medication 12.5 MILLIGRAM(S): at 21:55

## 2018-05-18 RX ADMIN — Medication 12.5 MILLIGRAM(S): at 08:40

## 2018-05-18 RX ADMIN — OLANZAPINE 40 MILLIGRAM(S): 15 TABLET, FILM COATED ORAL at 21:55

## 2018-05-19 RX ADMIN — Medication 12.5 MILLIGRAM(S): at 08:50

## 2018-05-19 RX ADMIN — Medication 12.5 MILLIGRAM(S): at 21:31

## 2018-05-19 RX ADMIN — OLANZAPINE 40 MILLIGRAM(S): 15 TABLET, FILM COATED ORAL at 21:31

## 2018-05-20 RX ADMIN — OLANZAPINE 40 MILLIGRAM(S): 15 TABLET, FILM COATED ORAL at 22:08

## 2018-05-20 RX ADMIN — Medication 12.5 MILLIGRAM(S): at 22:08

## 2018-05-20 RX ADMIN — Medication 12.5 MILLIGRAM(S): at 08:52

## 2018-05-21 PROCEDURE — 99231 SBSQ HOSP IP/OBS SF/LOW 25: CPT

## 2018-05-21 RX ADMIN — OLANZAPINE 40 MILLIGRAM(S): 15 TABLET, FILM COATED ORAL at 20:38

## 2018-05-21 RX ADMIN — Medication 12.5 MILLIGRAM(S): at 20:38

## 2018-05-21 RX ADMIN — Medication 12.5 MILLIGRAM(S): at 09:09

## 2018-05-22 PROCEDURE — 99231 SBSQ HOSP IP/OBS SF/LOW 25: CPT

## 2018-05-22 RX ADMIN — Medication 12.5 MILLIGRAM(S): at 21:31

## 2018-05-22 RX ADMIN — Medication 12.5 MILLIGRAM(S): at 08:44

## 2018-05-22 RX ADMIN — OLANZAPINE 40 MILLIGRAM(S): 15 TABLET, FILM COATED ORAL at 21:31

## 2018-05-23 PROCEDURE — 99231 SBSQ HOSP IP/OBS SF/LOW 25: CPT

## 2018-05-23 RX ADMIN — Medication 12.5 MILLIGRAM(S): at 21:22

## 2018-05-23 RX ADMIN — Medication 12.5 MILLIGRAM(S): at 08:44

## 2018-05-23 RX ADMIN — OLANZAPINE 40 MILLIGRAM(S): 15 TABLET, FILM COATED ORAL at 21:22

## 2018-05-24 ENCOUNTER — OUTPATIENT (OUTPATIENT)
Dept: OUTPATIENT SERVICES | Facility: HOSPITAL | Age: 53
LOS: 1 days | End: 2018-05-24

## 2018-05-24 ENCOUNTER — APPOINTMENT (OUTPATIENT)
Dept: CV DIAGNOSITCS | Facility: HOSPITAL | Age: 53
End: 2018-05-24

## 2018-05-24 DIAGNOSIS — R07.9 CHEST PAIN, UNSPECIFIED: ICD-10-CM

## 2018-05-24 PROCEDURE — 99231 SBSQ HOSP IP/OBS SF/LOW 25: CPT

## 2018-05-24 PROCEDURE — 93306 TTE W/DOPPLER COMPLETE: CPT | Mod: 26

## 2018-05-24 RX ADMIN — OLANZAPINE 40 MILLIGRAM(S): 15 TABLET, FILM COATED ORAL at 21:00

## 2018-05-24 RX ADMIN — Medication 12.5 MILLIGRAM(S): at 21:00

## 2018-05-24 RX ADMIN — Medication 12.5 MILLIGRAM(S): at 10:14

## 2018-05-25 PROCEDURE — 99231 SBSQ HOSP IP/OBS SF/LOW 25: CPT

## 2018-05-25 RX ADMIN — OLANZAPINE 40 MILLIGRAM(S): 15 TABLET, FILM COATED ORAL at 21:32

## 2018-05-25 RX ADMIN — Medication 12.5 MILLIGRAM(S): at 09:14

## 2018-05-25 RX ADMIN — Medication 12.5 MILLIGRAM(S): at 21:32

## 2018-05-26 RX ADMIN — OLANZAPINE 40 MILLIGRAM(S): 15 TABLET, FILM COATED ORAL at 22:00

## 2018-05-26 RX ADMIN — Medication 12.5 MILLIGRAM(S): at 08:49

## 2018-05-26 RX ADMIN — Medication 12.5 MILLIGRAM(S): at 22:00

## 2018-05-27 RX ADMIN — Medication 12.5 MILLIGRAM(S): at 20:39

## 2018-05-27 RX ADMIN — OLANZAPINE 40 MILLIGRAM(S): 15 TABLET, FILM COATED ORAL at 20:39

## 2018-05-27 RX ADMIN — Medication 12.5 MILLIGRAM(S): at 09:09

## 2018-05-28 LAB
BASOPHILS # BLD AUTO: 0.04 K/UL — SIGNIFICANT CHANGE UP (ref 0–0.2)
BASOPHILS NFR BLD AUTO: 0.8 % — SIGNIFICANT CHANGE UP (ref 0–2)
CHOLEST SERPL-MCNC: 166 MG/DL — SIGNIFICANT CHANGE UP (ref 120–199)
EOSINOPHIL # BLD AUTO: 0.22 K/UL — SIGNIFICANT CHANGE UP (ref 0–0.5)
EOSINOPHIL NFR BLD AUTO: 4.2 % — SIGNIFICANT CHANGE UP (ref 0–6)
HBA1C BLD-MCNC: 5.6 % — SIGNIFICANT CHANGE UP (ref 4–5.6)
HCT VFR BLD CALC: 47.2 % — SIGNIFICANT CHANGE UP (ref 39–50)
HDLC SERPL-MCNC: 43 MG/DL — SIGNIFICANT CHANGE UP (ref 35–55)
HGB BLD-MCNC: 15.7 G/DL — SIGNIFICANT CHANGE UP (ref 13–17)
IMM GRANULOCYTES # BLD AUTO: 0.03 # — SIGNIFICANT CHANGE UP
IMM GRANULOCYTES NFR BLD AUTO: 0.6 % — SIGNIFICANT CHANGE UP (ref 0–1.5)
LIPID PNL WITH DIRECT LDL SERPL: 110 MG/DL — SIGNIFICANT CHANGE UP
LYMPHOCYTES # BLD AUTO: 2.15 K/UL — SIGNIFICANT CHANGE UP (ref 1–3.3)
LYMPHOCYTES # BLD AUTO: 40.9 % — SIGNIFICANT CHANGE UP (ref 13–44)
MCHC RBC-ENTMCNC: 31 PG — SIGNIFICANT CHANGE UP (ref 27–34)
MCHC RBC-ENTMCNC: 33.3 % — SIGNIFICANT CHANGE UP (ref 32–36)
MCV RBC AUTO: 93.1 FL — SIGNIFICANT CHANGE UP (ref 80–100)
MONOCYTES # BLD AUTO: 0.39 K/UL — SIGNIFICANT CHANGE UP (ref 0–0.9)
MONOCYTES NFR BLD AUTO: 7.4 % — SIGNIFICANT CHANGE UP (ref 2–14)
NEUTROPHILS # BLD AUTO: 2.43 K/UL — SIGNIFICANT CHANGE UP (ref 1.8–7.4)
NEUTROPHILS NFR BLD AUTO: 46.1 % — SIGNIFICANT CHANGE UP (ref 43–77)
NRBC # FLD: 0 — SIGNIFICANT CHANGE UP
PLATELET # BLD AUTO: 173 K/UL — SIGNIFICANT CHANGE UP (ref 150–400)
PMV BLD: 11.3 FL — SIGNIFICANT CHANGE UP (ref 7–13)
RBC # BLD: 5.07 M/UL — SIGNIFICANT CHANGE UP (ref 4.2–5.8)
RBC # FLD: 12.7 % — SIGNIFICANT CHANGE UP (ref 10.3–14.5)
TRIGL SERPL-MCNC: 94 MG/DL — SIGNIFICANT CHANGE UP (ref 10–149)
WBC # BLD: 5.26 K/UL — SIGNIFICANT CHANGE UP (ref 3.8–10.5)
WBC # FLD AUTO: 5.26 K/UL — SIGNIFICANT CHANGE UP (ref 3.8–10.5)

## 2018-05-28 RX ADMIN — OLANZAPINE 40 MILLIGRAM(S): 15 TABLET, FILM COATED ORAL at 21:37

## 2018-05-28 RX ADMIN — Medication 12.5 MILLIGRAM(S): at 09:07

## 2018-05-28 RX ADMIN — Medication 12.5 MILLIGRAM(S): at 21:37

## 2018-05-29 PROCEDURE — 99231 SBSQ HOSP IP/OBS SF/LOW 25: CPT

## 2018-05-29 RX ORDER — CLOZAPINE 150 MG/1
25 TABLET, ORALLY DISINTEGRATING ORAL AT BEDTIME
Qty: 0 | Refills: 0 | Status: DISCONTINUED | OUTPATIENT
Start: 2018-05-29 | End: 2018-05-30

## 2018-05-29 RX ADMIN — CLOZAPINE 25 MILLIGRAM(S): 150 TABLET, ORALLY DISINTEGRATING ORAL at 21:58

## 2018-05-29 RX ADMIN — Medication 12.5 MILLIGRAM(S): at 21:58

## 2018-05-29 RX ADMIN — OLANZAPINE 40 MILLIGRAM(S): 15 TABLET, FILM COATED ORAL at 21:59

## 2018-05-30 PROCEDURE — 99231 SBSQ HOSP IP/OBS SF/LOW 25: CPT

## 2018-05-30 RX ORDER — CLOZAPINE 150 MG/1
50 TABLET, ORALLY DISINTEGRATING ORAL AT BEDTIME
Qty: 0 | Refills: 0 | Status: DISCONTINUED | OUTPATIENT
Start: 2018-05-30 | End: 2018-05-31

## 2018-05-30 RX ADMIN — CLOZAPINE 50 MILLIGRAM(S): 150 TABLET, ORALLY DISINTEGRATING ORAL at 20:53

## 2018-05-30 RX ADMIN — OLANZAPINE 40 MILLIGRAM(S): 15 TABLET, FILM COATED ORAL at 20:54

## 2018-05-30 RX ADMIN — Medication 12.5 MILLIGRAM(S): at 20:54

## 2018-05-30 RX ADMIN — Medication 12.5 MILLIGRAM(S): at 09:54

## 2018-05-31 PROCEDURE — 99231 SBSQ HOSP IP/OBS SF/LOW 25: CPT

## 2018-05-31 RX ORDER — CLOZAPINE 150 MG/1
75 TABLET, ORALLY DISINTEGRATING ORAL AT BEDTIME
Qty: 0 | Refills: 0 | Status: DISCONTINUED | OUTPATIENT
Start: 2018-05-31 | End: 2018-06-01

## 2018-05-31 RX ADMIN — Medication 12.5 MILLIGRAM(S): at 08:39

## 2018-05-31 RX ADMIN — OLANZAPINE 40 MILLIGRAM(S): 15 TABLET, FILM COATED ORAL at 20:42

## 2018-05-31 RX ADMIN — Medication 12.5 MILLIGRAM(S): at 20:42

## 2018-05-31 RX ADMIN — CLOZAPINE 75 MILLIGRAM(S): 150 TABLET, ORALLY DISINTEGRATING ORAL at 20:42

## 2018-06-01 PROCEDURE — 99231 SBSQ HOSP IP/OBS SF/LOW 25: CPT

## 2018-06-01 RX ORDER — CLOZAPINE 150 MG/1
100 TABLET, ORALLY DISINTEGRATING ORAL AT BEDTIME
Qty: 0 | Refills: 0 | Status: DISCONTINUED | OUTPATIENT
Start: 2018-06-01 | End: 2018-06-03

## 2018-06-01 RX ADMIN — OLANZAPINE 40 MILLIGRAM(S): 15 TABLET, FILM COATED ORAL at 21:17

## 2018-06-01 RX ADMIN — Medication 12.5 MILLIGRAM(S): at 08:25

## 2018-06-01 RX ADMIN — Medication 12.5 MILLIGRAM(S): at 21:17

## 2018-06-01 RX ADMIN — CLOZAPINE 100 MILLIGRAM(S): 150 TABLET, ORALLY DISINTEGRATING ORAL at 21:16

## 2018-06-02 RX ADMIN — CLOZAPINE 100 MILLIGRAM(S): 150 TABLET, ORALLY DISINTEGRATING ORAL at 20:04

## 2018-06-02 RX ADMIN — Medication 12.5 MILLIGRAM(S): at 20:04

## 2018-06-02 RX ADMIN — Medication 12.5 MILLIGRAM(S): at 08:59

## 2018-06-02 RX ADMIN — OLANZAPINE 40 MILLIGRAM(S): 15 TABLET, FILM COATED ORAL at 20:04

## 2018-06-03 RX ORDER — CLOZAPINE 150 MG/1
150 TABLET, ORALLY DISINTEGRATING ORAL AT BEDTIME
Qty: 0 | Refills: 0 | Status: DISCONTINUED | OUTPATIENT
Start: 2018-06-03 | End: 2018-06-04

## 2018-06-03 RX ADMIN — Medication 12.5 MILLIGRAM(S): at 20:16

## 2018-06-03 RX ADMIN — OLANZAPINE 40 MILLIGRAM(S): 15 TABLET, FILM COATED ORAL at 20:17

## 2018-06-03 RX ADMIN — Medication 12.5 MILLIGRAM(S): at 08:28

## 2018-06-03 RX ADMIN — CLOZAPINE 150 MILLIGRAM(S): 150 TABLET, ORALLY DISINTEGRATING ORAL at 20:16

## 2018-06-04 LAB
BASOPHILS # BLD AUTO: 0.06 K/UL — SIGNIFICANT CHANGE UP (ref 0–0.2)
BASOPHILS NFR BLD AUTO: 1 % — SIGNIFICANT CHANGE UP (ref 0–2)
EOSINOPHIL # BLD AUTO: 0.27 K/UL — SIGNIFICANT CHANGE UP (ref 0–0.5)
EOSINOPHIL NFR BLD AUTO: 4.4 % — SIGNIFICANT CHANGE UP (ref 0–6)
HCT VFR BLD CALC: 50.6 % — HIGH (ref 39–50)
HGB BLD-MCNC: 16.6 G/DL — SIGNIFICANT CHANGE UP (ref 13–17)
IMM GRANULOCYTES # BLD AUTO: 0.04 # — SIGNIFICANT CHANGE UP
IMM GRANULOCYTES NFR BLD AUTO: 0.7 % — SIGNIFICANT CHANGE UP (ref 0–1.5)
LYMPHOCYTES # BLD AUTO: 2.45 K/UL — SIGNIFICANT CHANGE UP (ref 1–3.3)
LYMPHOCYTES # BLD AUTO: 40 % — SIGNIFICANT CHANGE UP (ref 13–44)
MCHC RBC-ENTMCNC: 30.9 PG — SIGNIFICANT CHANGE UP (ref 27–34)
MCHC RBC-ENTMCNC: 32.8 % — SIGNIFICANT CHANGE UP (ref 32–36)
MCV RBC AUTO: 94.2 FL — SIGNIFICANT CHANGE UP (ref 80–100)
MONOCYTES # BLD AUTO: 0.54 K/UL — SIGNIFICANT CHANGE UP (ref 0–0.9)
MONOCYTES NFR BLD AUTO: 8.8 % — SIGNIFICANT CHANGE UP (ref 2–14)
NEUTROPHILS # BLD AUTO: 2.76 K/UL — SIGNIFICANT CHANGE UP (ref 1.8–7.4)
NEUTROPHILS NFR BLD AUTO: 45.1 % — SIGNIFICANT CHANGE UP (ref 43–77)
NRBC # FLD: 0 — SIGNIFICANT CHANGE UP
PLATELET # BLD AUTO: 192 K/UL — SIGNIFICANT CHANGE UP (ref 150–400)
PMV BLD: 10.9 FL — SIGNIFICANT CHANGE UP (ref 7–13)
RBC # BLD: 5.37 M/UL — SIGNIFICANT CHANGE UP (ref 4.2–5.8)
RBC # FLD: 12.7 % — SIGNIFICANT CHANGE UP (ref 10.3–14.5)
WBC # BLD: 6.12 K/UL — SIGNIFICANT CHANGE UP (ref 3.8–10.5)
WBC # FLD AUTO: 6.12 K/UL — SIGNIFICANT CHANGE UP (ref 3.8–10.5)

## 2018-06-04 PROCEDURE — 99231 SBSQ HOSP IP/OBS SF/LOW 25: CPT

## 2018-06-04 RX ORDER — CLOZAPINE 150 MG/1
200 TABLET, ORALLY DISINTEGRATING ORAL AT BEDTIME
Qty: 0 | Refills: 0 | Status: DISCONTINUED | OUTPATIENT
Start: 2018-06-04 | End: 2018-06-05

## 2018-06-04 RX ADMIN — CLOZAPINE 200 MILLIGRAM(S): 150 TABLET, ORALLY DISINTEGRATING ORAL at 20:31

## 2018-06-04 RX ADMIN — Medication 12.5 MILLIGRAM(S): at 08:25

## 2018-06-04 RX ADMIN — OLANZAPINE 40 MILLIGRAM(S): 15 TABLET, FILM COATED ORAL at 20:30

## 2018-06-04 RX ADMIN — Medication 12.5 MILLIGRAM(S): at 20:30

## 2018-06-05 PROCEDURE — 99231 SBSQ HOSP IP/OBS SF/LOW 25: CPT

## 2018-06-05 RX ORDER — OLANZAPINE 15 MG/1
30 TABLET, FILM COATED ORAL AT BEDTIME
Qty: 0 | Refills: 0 | Status: DISCONTINUED | OUTPATIENT
Start: 2018-06-05 | End: 2018-06-07

## 2018-06-05 RX ORDER — CLOZAPINE 150 MG/1
250 TABLET, ORALLY DISINTEGRATING ORAL AT BEDTIME
Qty: 0 | Refills: 0 | Status: DISCONTINUED | OUTPATIENT
Start: 2018-06-05 | End: 2018-06-06

## 2018-06-05 RX ADMIN — Medication 12.5 MILLIGRAM(S): at 21:25

## 2018-06-05 RX ADMIN — Medication 12.5 MILLIGRAM(S): at 08:31

## 2018-06-05 RX ADMIN — OLANZAPINE 30 MILLIGRAM(S): 15 TABLET, FILM COATED ORAL at 21:25

## 2018-06-05 RX ADMIN — CLOZAPINE 250 MILLIGRAM(S): 150 TABLET, ORALLY DISINTEGRATING ORAL at 21:25

## 2018-06-06 PROCEDURE — 99231 SBSQ HOSP IP/OBS SF/LOW 25: CPT

## 2018-06-06 RX ORDER — CLOZAPINE 150 MG/1
300 TABLET, ORALLY DISINTEGRATING ORAL AT BEDTIME
Qty: 0 | Refills: 0 | Status: DISCONTINUED | OUTPATIENT
Start: 2018-06-06 | End: 2018-07-06

## 2018-06-06 RX ADMIN — CLOZAPINE 300 MILLIGRAM(S): 150 TABLET, ORALLY DISINTEGRATING ORAL at 22:03

## 2018-06-06 RX ADMIN — Medication 12.5 MILLIGRAM(S): at 08:49

## 2018-06-06 RX ADMIN — Medication 12.5 MILLIGRAM(S): at 22:03

## 2018-06-06 RX ADMIN — OLANZAPINE 30 MILLIGRAM(S): 15 TABLET, FILM COATED ORAL at 22:03

## 2018-06-07 PROCEDURE — 99231 SBSQ HOSP IP/OBS SF/LOW 25: CPT

## 2018-06-07 RX ORDER — OLANZAPINE 15 MG/1
20 TABLET, FILM COATED ORAL AT BEDTIME
Qty: 0 | Refills: 0 | Status: DISCONTINUED | OUTPATIENT
Start: 2018-06-07 | End: 2018-06-11

## 2018-06-07 RX ADMIN — CLOZAPINE 300 MILLIGRAM(S): 150 TABLET, ORALLY DISINTEGRATING ORAL at 21:27

## 2018-06-07 RX ADMIN — Medication 12.5 MILLIGRAM(S): at 08:50

## 2018-06-07 RX ADMIN — Medication 12.5 MILLIGRAM(S): at 21:27

## 2018-06-07 RX ADMIN — OLANZAPINE 20 MILLIGRAM(S): 15 TABLET, FILM COATED ORAL at 21:27

## 2018-06-08 PROCEDURE — 99231 SBSQ HOSP IP/OBS SF/LOW 25: CPT

## 2018-06-08 RX ADMIN — OLANZAPINE 20 MILLIGRAM(S): 15 TABLET, FILM COATED ORAL at 21:15

## 2018-06-08 RX ADMIN — CLOZAPINE 300 MILLIGRAM(S): 150 TABLET, ORALLY DISINTEGRATING ORAL at 21:16

## 2018-06-08 RX ADMIN — Medication 12.5 MILLIGRAM(S): at 08:49

## 2018-06-08 RX ADMIN — Medication 12.5 MILLIGRAM(S): at 21:15

## 2018-06-09 RX ADMIN — Medication 12.5 MILLIGRAM(S): at 09:24

## 2018-06-09 RX ADMIN — OLANZAPINE 20 MILLIGRAM(S): 15 TABLET, FILM COATED ORAL at 21:17

## 2018-06-09 RX ADMIN — CLOZAPINE 300 MILLIGRAM(S): 150 TABLET, ORALLY DISINTEGRATING ORAL at 21:17

## 2018-06-09 RX ADMIN — Medication 12.5 MILLIGRAM(S): at 21:17

## 2018-06-10 RX ADMIN — OLANZAPINE 20 MILLIGRAM(S): 15 TABLET, FILM COATED ORAL at 21:30

## 2018-06-10 RX ADMIN — Medication 12.5 MILLIGRAM(S): at 08:25

## 2018-06-10 RX ADMIN — CLOZAPINE 300 MILLIGRAM(S): 150 TABLET, ORALLY DISINTEGRATING ORAL at 21:30

## 2018-06-10 RX ADMIN — Medication 12.5 MILLIGRAM(S): at 21:30

## 2018-06-11 LAB
BASOPHILS # BLD AUTO: 0.05 K/UL — SIGNIFICANT CHANGE UP (ref 0–0.2)
BASOPHILS NFR BLD AUTO: 0.7 % — SIGNIFICANT CHANGE UP (ref 0–2)
EOSINOPHIL # BLD AUTO: 0.3 K/UL — SIGNIFICANT CHANGE UP (ref 0–0.5)
EOSINOPHIL NFR BLD AUTO: 3.9 % — SIGNIFICANT CHANGE UP (ref 0–6)
HCT VFR BLD CALC: 46.3 % — SIGNIFICANT CHANGE UP (ref 39–50)
HGB BLD-MCNC: 15.7 G/DL — SIGNIFICANT CHANGE UP (ref 13–17)
IMM GRANULOCYTES # BLD AUTO: 0.04 # — SIGNIFICANT CHANGE UP
IMM GRANULOCYTES NFR BLD AUTO: 0.5 % — SIGNIFICANT CHANGE UP (ref 0–1.5)
LYMPHOCYTES # BLD AUTO: 2.22 K/UL — SIGNIFICANT CHANGE UP (ref 1–3.3)
LYMPHOCYTES # BLD AUTO: 29 % — SIGNIFICANT CHANGE UP (ref 13–44)
MCHC RBC-ENTMCNC: 31.4 PG — SIGNIFICANT CHANGE UP (ref 27–34)
MCHC RBC-ENTMCNC: 33.9 % — SIGNIFICANT CHANGE UP (ref 32–36)
MCV RBC AUTO: 92.6 FL — SIGNIFICANT CHANGE UP (ref 80–100)
MONOCYTES # BLD AUTO: 0.62 K/UL — SIGNIFICANT CHANGE UP (ref 0–0.9)
MONOCYTES NFR BLD AUTO: 8.1 % — SIGNIFICANT CHANGE UP (ref 2–14)
NEUTROPHILS # BLD AUTO: 4.43 K/UL — SIGNIFICANT CHANGE UP (ref 1.8–7.4)
NEUTROPHILS NFR BLD AUTO: 57.8 % — SIGNIFICANT CHANGE UP (ref 43–77)
NRBC # FLD: 0 — SIGNIFICANT CHANGE UP
PLATELET # BLD AUTO: 207 K/UL — SIGNIFICANT CHANGE UP (ref 150–400)
PMV BLD: 11.3 FL — SIGNIFICANT CHANGE UP (ref 7–13)
RBC # BLD: 5 M/UL — SIGNIFICANT CHANGE UP (ref 4.2–5.8)
RBC # FLD: 12.3 % — SIGNIFICANT CHANGE UP (ref 10.3–14.5)
WBC # BLD: 7.66 K/UL — SIGNIFICANT CHANGE UP (ref 3.8–10.5)
WBC # FLD AUTO: 7.66 K/UL — SIGNIFICANT CHANGE UP (ref 3.8–10.5)

## 2018-06-11 PROCEDURE — 99231 SBSQ HOSP IP/OBS SF/LOW 25: CPT

## 2018-06-11 RX ORDER — OLANZAPINE 15 MG/1
10 TABLET, FILM COATED ORAL AT BEDTIME
Qty: 0 | Refills: 0 | Status: DISCONTINUED | OUTPATIENT
Start: 2018-06-11 | End: 2018-06-12

## 2018-06-11 RX ADMIN — Medication 12.5 MILLIGRAM(S): at 21:22

## 2018-06-11 RX ADMIN — OLANZAPINE 10 MILLIGRAM(S): 15 TABLET, FILM COATED ORAL at 21:22

## 2018-06-11 RX ADMIN — Medication 12.5 MILLIGRAM(S): at 08:26

## 2018-06-11 RX ADMIN — CLOZAPINE 300 MILLIGRAM(S): 150 TABLET, ORALLY DISINTEGRATING ORAL at 21:22

## 2018-06-12 PROCEDURE — 99231 SBSQ HOSP IP/OBS SF/LOW 25: CPT

## 2018-06-12 RX ADMIN — Medication 12.5 MILLIGRAM(S): at 21:15

## 2018-06-12 RX ADMIN — Medication 12.5 MILLIGRAM(S): at 08:40

## 2018-06-12 RX ADMIN — CLOZAPINE 300 MILLIGRAM(S): 150 TABLET, ORALLY DISINTEGRATING ORAL at 21:15

## 2018-06-13 PROCEDURE — 99231 SBSQ HOSP IP/OBS SF/LOW 25: CPT

## 2018-06-13 RX ADMIN — Medication 12.5 MILLIGRAM(S): at 09:23

## 2018-06-13 RX ADMIN — Medication 12.5 MILLIGRAM(S): at 21:51

## 2018-06-13 RX ADMIN — CLOZAPINE 300 MILLIGRAM(S): 150 TABLET, ORALLY DISINTEGRATING ORAL at 21:51

## 2018-06-14 PROCEDURE — 99231 SBSQ HOSP IP/OBS SF/LOW 25: CPT

## 2018-06-14 RX ADMIN — Medication 12.5 MILLIGRAM(S): at 09:08

## 2018-06-14 RX ADMIN — Medication 12.5 MILLIGRAM(S): at 22:06

## 2018-06-14 RX ADMIN — CLOZAPINE 300 MILLIGRAM(S): 150 TABLET, ORALLY DISINTEGRATING ORAL at 22:05

## 2018-06-15 LAB — CLOZAPINE SERPL-MCNC: SIGNIFICANT CHANGE UP

## 2018-06-15 PROCEDURE — 99231 SBSQ HOSP IP/OBS SF/LOW 25: CPT

## 2018-06-15 RX ADMIN — Medication 12.5 MILLIGRAM(S): at 10:13

## 2018-06-15 RX ADMIN — Medication 12.5 MILLIGRAM(S): at 22:09

## 2018-06-15 RX ADMIN — CLOZAPINE 300 MILLIGRAM(S): 150 TABLET, ORALLY DISINTEGRATING ORAL at 22:09

## 2018-06-16 RX ADMIN — CLOZAPINE 300 MILLIGRAM(S): 150 TABLET, ORALLY DISINTEGRATING ORAL at 20:19

## 2018-06-16 RX ADMIN — Medication 12.5 MILLIGRAM(S): at 20:19

## 2018-06-16 RX ADMIN — Medication 12.5 MILLIGRAM(S): at 08:46

## 2018-06-17 RX ADMIN — CLOZAPINE 300 MILLIGRAM(S): 150 TABLET, ORALLY DISINTEGRATING ORAL at 21:23

## 2018-06-17 RX ADMIN — Medication 12.5 MILLIGRAM(S): at 21:23

## 2018-06-17 RX ADMIN — Medication 12.5 MILLIGRAM(S): at 10:21

## 2018-06-18 LAB
BASOPHILS # BLD AUTO: 0.06 K/UL — SIGNIFICANT CHANGE UP (ref 0–0.2)
BASOPHILS NFR BLD AUTO: 0.7 % — SIGNIFICANT CHANGE UP (ref 0–2)
EOSINOPHIL # BLD AUTO: 0.49 K/UL — SIGNIFICANT CHANGE UP (ref 0–0.5)
EOSINOPHIL NFR BLD AUTO: 5.8 % — SIGNIFICANT CHANGE UP (ref 0–6)
HCT VFR BLD CALC: 47.2 % — SIGNIFICANT CHANGE UP (ref 39–50)
HGB BLD-MCNC: 15.6 G/DL — SIGNIFICANT CHANGE UP (ref 13–17)
IMM GRANULOCYTES # BLD AUTO: 0.06 # — SIGNIFICANT CHANGE UP
IMM GRANULOCYTES NFR BLD AUTO: 0.7 % — SIGNIFICANT CHANGE UP (ref 0–1.5)
LYMPHOCYTES # BLD AUTO: 2.66 K/UL — SIGNIFICANT CHANGE UP (ref 1–3.3)
LYMPHOCYTES # BLD AUTO: 31.4 % — SIGNIFICANT CHANGE UP (ref 13–44)
MCHC RBC-ENTMCNC: 30.5 PG — SIGNIFICANT CHANGE UP (ref 27–34)
MCHC RBC-ENTMCNC: 33.1 % — SIGNIFICANT CHANGE UP (ref 32–36)
MCV RBC AUTO: 92.2 FL — SIGNIFICANT CHANGE UP (ref 80–100)
MONOCYTES # BLD AUTO: 0.55 K/UL — SIGNIFICANT CHANGE UP (ref 0–0.9)
MONOCYTES NFR BLD AUTO: 6.5 % — SIGNIFICANT CHANGE UP (ref 2–14)
NEUTROPHILS # BLD AUTO: 4.66 K/UL — SIGNIFICANT CHANGE UP (ref 1.8–7.4)
NEUTROPHILS NFR BLD AUTO: 54.9 % — SIGNIFICANT CHANGE UP (ref 43–77)
NRBC # FLD: 0 — SIGNIFICANT CHANGE UP
PLATELET # BLD AUTO: 222 K/UL — SIGNIFICANT CHANGE UP (ref 150–400)
PMV BLD: 11.1 FL — SIGNIFICANT CHANGE UP (ref 7–13)
RBC # BLD: 5.12 M/UL — SIGNIFICANT CHANGE UP (ref 4.2–5.8)
RBC # FLD: 12 % — SIGNIFICANT CHANGE UP (ref 10.3–14.5)
WBC # BLD: 8.48 K/UL — SIGNIFICANT CHANGE UP (ref 3.8–10.5)
WBC # FLD AUTO: 8.48 K/UL — SIGNIFICANT CHANGE UP (ref 3.8–10.5)

## 2018-06-18 PROCEDURE — 99231 SBSQ HOSP IP/OBS SF/LOW 25: CPT

## 2018-06-18 RX ADMIN — Medication 12.5 MILLIGRAM(S): at 21:54

## 2018-06-18 RX ADMIN — CLOZAPINE 300 MILLIGRAM(S): 150 TABLET, ORALLY DISINTEGRATING ORAL at 21:54

## 2018-06-19 PROCEDURE — 99231 SBSQ HOSP IP/OBS SF/LOW 25: CPT

## 2018-06-19 RX ADMIN — Medication 12.5 MILLIGRAM(S): at 22:17

## 2018-06-19 RX ADMIN — Medication 12.5 MILLIGRAM(S): at 09:03

## 2018-06-19 RX ADMIN — CLOZAPINE 300 MILLIGRAM(S): 150 TABLET, ORALLY DISINTEGRATING ORAL at 22:17

## 2018-06-20 PROCEDURE — 99231 SBSQ HOSP IP/OBS SF/LOW 25: CPT

## 2018-06-20 RX ADMIN — Medication 12.5 MILLIGRAM(S): at 21:49

## 2018-06-20 RX ADMIN — CLOZAPINE 300 MILLIGRAM(S): 150 TABLET, ORALLY DISINTEGRATING ORAL at 21:49

## 2018-06-20 RX ADMIN — Medication 12.5 MILLIGRAM(S): at 09:13

## 2018-06-21 PROCEDURE — 99231 SBSQ HOSP IP/OBS SF/LOW 25: CPT

## 2018-06-21 RX ADMIN — Medication 12.5 MILLIGRAM(S): at 21:30

## 2018-06-21 RX ADMIN — Medication 12.5 MILLIGRAM(S): at 10:15

## 2018-06-21 RX ADMIN — CLOZAPINE 300 MILLIGRAM(S): 150 TABLET, ORALLY DISINTEGRATING ORAL at 21:30

## 2018-06-22 PROCEDURE — 99231 SBSQ HOSP IP/OBS SF/LOW 25: CPT

## 2018-06-22 RX ADMIN — Medication 12.5 MILLIGRAM(S): at 22:08

## 2018-06-22 RX ADMIN — CLOZAPINE 300 MILLIGRAM(S): 150 TABLET, ORALLY DISINTEGRATING ORAL at 22:08

## 2018-06-22 RX ADMIN — Medication 12.5 MILLIGRAM(S): at 09:03

## 2018-06-23 RX ADMIN — CLOZAPINE 300 MILLIGRAM(S): 150 TABLET, ORALLY DISINTEGRATING ORAL at 21:50

## 2018-06-23 RX ADMIN — Medication 12.5 MILLIGRAM(S): at 21:50

## 2018-06-23 RX ADMIN — Medication 12.5 MILLIGRAM(S): at 10:47

## 2018-06-24 RX ADMIN — Medication 12.5 MILLIGRAM(S): at 21:28

## 2018-06-24 RX ADMIN — Medication 12.5 MILLIGRAM(S): at 08:38

## 2018-06-24 RX ADMIN — CLOZAPINE 300 MILLIGRAM(S): 150 TABLET, ORALLY DISINTEGRATING ORAL at 21:28

## 2018-06-25 LAB
BASOPHILS # BLD AUTO: 0.13 K/UL — SIGNIFICANT CHANGE UP (ref 0–0.2)
BASOPHILS NFR BLD AUTO: 1.5 % — SIGNIFICANT CHANGE UP (ref 0–2)
EOSINOPHIL # BLD AUTO: 0.78 K/UL — HIGH (ref 0–0.5)
EOSINOPHIL NFR BLD AUTO: 8.8 % — HIGH (ref 0–6)
HCT VFR BLD CALC: 47.5 % — SIGNIFICANT CHANGE UP (ref 39–50)
HGB BLD-MCNC: 16.3 G/DL — SIGNIFICANT CHANGE UP (ref 13–17)
IMM GRANULOCYTES # BLD AUTO: 0.06 # — SIGNIFICANT CHANGE UP
IMM GRANULOCYTES NFR BLD AUTO: 0.7 % — SIGNIFICANT CHANGE UP (ref 0–1.5)
LYMPHOCYTES # BLD AUTO: 3.27 K/UL — SIGNIFICANT CHANGE UP (ref 1–3.3)
LYMPHOCYTES # BLD AUTO: 36.8 % — SIGNIFICANT CHANGE UP (ref 13–44)
MCHC RBC-ENTMCNC: 30.4 PG — SIGNIFICANT CHANGE UP (ref 27–34)
MCHC RBC-ENTMCNC: 34.3 % — SIGNIFICANT CHANGE UP (ref 32–36)
MCV RBC AUTO: 88.5 FL — SIGNIFICANT CHANGE UP (ref 80–100)
MONOCYTES # BLD AUTO: 0.58 K/UL — SIGNIFICANT CHANGE UP (ref 0–0.9)
MONOCYTES NFR BLD AUTO: 6.5 % — SIGNIFICANT CHANGE UP (ref 2–14)
NEUTROPHILS # BLD AUTO: 4.07 K/UL — SIGNIFICANT CHANGE UP (ref 1.8–7.4)
NEUTROPHILS NFR BLD AUTO: 45.7 % — SIGNIFICANT CHANGE UP (ref 43–77)
NRBC # FLD: 0 — SIGNIFICANT CHANGE UP
PLATELET # BLD AUTO: 283 K/UL — SIGNIFICANT CHANGE UP (ref 150–400)
PMV BLD: 11.2 FL — SIGNIFICANT CHANGE UP (ref 7–13)
RBC # BLD: 5.37 M/UL — SIGNIFICANT CHANGE UP (ref 4.2–5.8)
RBC # FLD: 12.3 % — SIGNIFICANT CHANGE UP (ref 10.3–14.5)
WBC # BLD: 8.89 K/UL — SIGNIFICANT CHANGE UP (ref 3.8–10.5)
WBC # FLD AUTO: 8.89 K/UL — SIGNIFICANT CHANGE UP (ref 3.8–10.5)

## 2018-06-25 RX ADMIN — Medication 12.5 MILLIGRAM(S): at 21:42

## 2018-06-25 RX ADMIN — CLOZAPINE 300 MILLIGRAM(S): 150 TABLET, ORALLY DISINTEGRATING ORAL at 21:42

## 2018-06-26 RX ADMIN — Medication 12.5 MILLIGRAM(S): at 09:29

## 2018-06-26 RX ADMIN — Medication 12.5 MILLIGRAM(S): at 21:30

## 2018-06-26 RX ADMIN — CLOZAPINE 300 MILLIGRAM(S): 150 TABLET, ORALLY DISINTEGRATING ORAL at 21:30

## 2018-06-27 RX ADMIN — CLOZAPINE 300 MILLIGRAM(S): 150 TABLET, ORALLY DISINTEGRATING ORAL at 22:25

## 2018-06-27 RX ADMIN — Medication 12.5 MILLIGRAM(S): at 22:25

## 2018-06-27 RX ADMIN — Medication 12.5 MILLIGRAM(S): at 08:49

## 2018-06-28 RX ADMIN — Medication 12.5 MILLIGRAM(S): at 20:33

## 2018-06-28 RX ADMIN — Medication 12.5 MILLIGRAM(S): at 09:07

## 2018-06-28 RX ADMIN — CLOZAPINE 300 MILLIGRAM(S): 150 TABLET, ORALLY DISINTEGRATING ORAL at 20:33

## 2018-06-29 RX ADMIN — Medication 12.5 MILLIGRAM(S): at 09:36

## 2018-06-29 RX ADMIN — CLOZAPINE 300 MILLIGRAM(S): 150 TABLET, ORALLY DISINTEGRATING ORAL at 20:59

## 2018-06-29 RX ADMIN — Medication 12.5 MILLIGRAM(S): at 20:59

## 2018-06-30 RX ADMIN — Medication 12.5 MILLIGRAM(S): at 09:07

## 2018-06-30 RX ADMIN — CLOZAPINE 300 MILLIGRAM(S): 150 TABLET, ORALLY DISINTEGRATING ORAL at 21:00

## 2018-06-30 RX ADMIN — Medication 12.5 MILLIGRAM(S): at 21:00

## 2018-07-01 RX ADMIN — Medication 12.5 MILLIGRAM(S): at 08:34

## 2018-07-01 RX ADMIN — CLOZAPINE 300 MILLIGRAM(S): 150 TABLET, ORALLY DISINTEGRATING ORAL at 20:54

## 2018-07-01 RX ADMIN — Medication 12.5 MILLIGRAM(S): at 20:55

## 2018-07-02 RX ADMIN — CLOZAPINE 300 MILLIGRAM(S): 150 TABLET, ORALLY DISINTEGRATING ORAL at 21:34

## 2018-07-02 RX ADMIN — Medication 12.5 MILLIGRAM(S): at 08:49

## 2018-07-02 RX ADMIN — Medication 12.5 MILLIGRAM(S): at 21:34

## 2018-07-03 RX ADMIN — Medication 12.5 MILLIGRAM(S): at 09:59

## 2018-07-03 RX ADMIN — CLOZAPINE 300 MILLIGRAM(S): 150 TABLET, ORALLY DISINTEGRATING ORAL at 21:34

## 2018-07-03 RX ADMIN — Medication 12.5 MILLIGRAM(S): at 21:34

## 2018-07-04 RX ADMIN — CLOZAPINE 300 MILLIGRAM(S): 150 TABLET, ORALLY DISINTEGRATING ORAL at 21:08

## 2018-07-04 RX ADMIN — Medication 12.5 MILLIGRAM(S): at 09:14

## 2018-07-04 RX ADMIN — Medication 12.5 MILLIGRAM(S): at 21:08

## 2018-07-05 RX ORDER — CLOZAPINE 150 MG/1
3 TABLET, ORALLY DISINTEGRATING ORAL
Qty: 0 | Refills: 0 | COMMUNITY
Start: 2018-07-05

## 2018-07-05 RX ORDER — METOPROLOL TARTRATE 50 MG
0.5 TABLET ORAL
Qty: 30 | Refills: 0 | OUTPATIENT
Start: 2018-07-05 | End: 2018-08-03

## 2018-07-05 RX ADMIN — Medication 12.5 MILLIGRAM(S): at 20:59

## 2018-07-05 RX ADMIN — CLOZAPINE 300 MILLIGRAM(S): 150 TABLET, ORALLY DISINTEGRATING ORAL at 20:59

## 2018-07-06 VITALS — TEMPERATURE: 98 F

## 2018-07-06 LAB
ALBUMIN SERPL ELPH-MCNC: 4.1 G/DL — SIGNIFICANT CHANGE UP (ref 3.3–5)
ALP SERPL-CCNC: 113 U/L — SIGNIFICANT CHANGE UP (ref 40–120)
ALT FLD-CCNC: 28 U/L — SIGNIFICANT CHANGE UP (ref 4–41)
AST SERPL-CCNC: 18 U/L — SIGNIFICANT CHANGE UP (ref 4–40)
BASOPHILS # BLD AUTO: 0.05 K/UL — SIGNIFICANT CHANGE UP (ref 0–0.2)
BASOPHILS NFR BLD AUTO: 0.6 % — SIGNIFICANT CHANGE UP (ref 0–2)
BILIRUB SERPL-MCNC: 0.4 MG/DL — SIGNIFICANT CHANGE UP (ref 0.2–1.2)
BUN SERPL-MCNC: 15 MG/DL — SIGNIFICANT CHANGE UP (ref 7–23)
CALCIUM SERPL-MCNC: 9.6 MG/DL — SIGNIFICANT CHANGE UP (ref 8.4–10.5)
CHLORIDE SERPL-SCNC: 103 MMOL/L — SIGNIFICANT CHANGE UP (ref 98–107)
CO2 SERPL-SCNC: 26 MMOL/L — SIGNIFICANT CHANGE UP (ref 22–31)
CREAT SERPL-MCNC: 1.01 MG/DL — SIGNIFICANT CHANGE UP (ref 0.5–1.3)
EOSINOPHIL # BLD AUTO: 0.69 K/UL — HIGH (ref 0–0.5)
EOSINOPHIL NFR BLD AUTO: 8.9 % — HIGH (ref 0–6)
GLUCOSE SERPL-MCNC: 88 MG/DL — SIGNIFICANT CHANGE UP (ref 70–99)
HBA1C BLD-MCNC: 5.8 % — HIGH (ref 4–5.6)
HCT VFR BLD CALC: 45.2 % — SIGNIFICANT CHANGE UP (ref 39–50)
HGB BLD-MCNC: 14.7 G/DL — SIGNIFICANT CHANGE UP (ref 13–17)
IMM GRANULOCYTES # BLD AUTO: 0.05 # — SIGNIFICANT CHANGE UP
IMM GRANULOCYTES NFR BLD AUTO: 0.6 % — SIGNIFICANT CHANGE UP (ref 0–1.5)
LYMPHOCYTES # BLD AUTO: 3.37 K/UL — HIGH (ref 1–3.3)
LYMPHOCYTES # BLD AUTO: 43.4 % — SIGNIFICANT CHANGE UP (ref 13–44)
MCHC RBC-ENTMCNC: 30.5 PG — SIGNIFICANT CHANGE UP (ref 27–34)
MCHC RBC-ENTMCNC: 32.5 % — SIGNIFICANT CHANGE UP (ref 32–36)
MCV RBC AUTO: 93.8 FL — SIGNIFICANT CHANGE UP (ref 80–100)
MONOCYTES # BLD AUTO: 0.58 K/UL — SIGNIFICANT CHANGE UP (ref 0–0.9)
MONOCYTES NFR BLD AUTO: 7.5 % — SIGNIFICANT CHANGE UP (ref 2–14)
NEUTROPHILS # BLD AUTO: 3.03 K/UL — SIGNIFICANT CHANGE UP (ref 1.8–7.4)
NEUTROPHILS NFR BLD AUTO: 39 % — LOW (ref 43–77)
NRBC # FLD: 0 — SIGNIFICANT CHANGE UP
PLATELET # BLD AUTO: 194 K/UL — SIGNIFICANT CHANGE UP (ref 150–400)
PMV BLD: 11.1 FL — SIGNIFICANT CHANGE UP (ref 7–13)
POTASSIUM SERPL-MCNC: 4.3 MMOL/L — SIGNIFICANT CHANGE UP (ref 3.5–5.3)
POTASSIUM SERPL-SCNC: 4.3 MMOL/L — SIGNIFICANT CHANGE UP (ref 3.5–5.3)
PROT SERPL-MCNC: 7.9 G/DL — SIGNIFICANT CHANGE UP (ref 6–8.3)
RBC # BLD: 4.82 M/UL — SIGNIFICANT CHANGE UP (ref 4.2–5.8)
RBC # FLD: 12.3 % — SIGNIFICANT CHANGE UP (ref 10.3–14.5)
SODIUM SERPL-SCNC: 141 MMOL/L — SIGNIFICANT CHANGE UP (ref 135–145)
WBC # BLD: 7.77 K/UL — SIGNIFICANT CHANGE UP (ref 3.8–10.5)
WBC # FLD AUTO: 7.77 K/UL — SIGNIFICANT CHANGE UP (ref 3.8–10.5)

## 2018-07-06 PROCEDURE — 99238 HOSP IP/OBS DSCHRG MGMT 30/<: CPT

## 2018-07-06 RX ADMIN — Medication 12.5 MILLIGRAM(S): at 09:42

## 2018-07-11 LAB — CLOZAPINE SERPL-MCNC: SIGNIFICANT CHANGE UP

## 2019-09-03 NOTE — ED ADULT NURSE NOTE - BP NONINVASIVE SYSTOLIC (MM HG)
1. Decrease the furosemide (Lasix) to 20 mg every other day  2. Stay on 2 potassium daily  3. Keep fluid intake at least 2 liters per day  4. Follow up with Dr. Cameron Germain today  5.  Follow up with me in 3 months
140

## 2020-05-24 NOTE — ED BEHAVIORAL HEALTH ASSESSMENT NOTE - NS ED BHA ED COURSE UTILIZATION OF 1 TO 1 IN ED YN
Pt BIB [facility] Dept. Pt w/ hx of seizures. Pt had 2 seizures today, back to back, w/ first one at 1040am each lasting about 1 min w/ postictal state. Pt takes Levetiracetam at 12pm daily and did not get today's dose of 100mg/mL.
No

## 2023-05-19 NOTE — ED PROVIDER NOTE - AGGRAVATING FACTORS
Notified with labs, renal function improved, continue no NSAIDs, continue adequate hydration with water, she did not do the remainder of her labs, she will do that before her next appointment, follow-up with cardiology next week.   stress

## 2023-08-24 NOTE — ED PROVIDER NOTE - QUALITY
[employer] message sent per protocol.          From: Ramy Chao  To: RANJITH English  Sent: 8/23/2023 11:54 AM CDT  Subject: stop taking atorovastin    Should I stop taking atorvastin for 5 days before I start the antibody covid medicine or is it it just stop taking atorvastin for 10 days total? altered level of consciousness

## 2023-09-01 NOTE — ED BEHAVIORAL HEALTH ASSESSMENT NOTE - LANGUAGE
Last Seen:2/06/23    Last Refilled:6/01/23    Next Appointment:8/31/23      Protocol not met will fwd to clinician for review.     
Rx denied, duplicate medication  
No abnormalities noted

## 2024-11-14 NOTE — ED PROVIDER NOTE - CROS ED ROS STATEMENT
Be sure to take all medications, over the counter medications or prescription medications only as directed.    Be sure to follow up as directed in 1-2 days. All of the details of your follow up instructions are detailed in the follow up section of this packet.    If you are being discharged with any pains medications or muscle relaxers (norco, Vicodin, hydrocodone products, Percocet, oxycodone products, flexeril, cyclobenzaprine, robaxin, norflex, brand or generic, or any other pain controlling medications with the exception of Ibuprofen and regular Tylenol, do not drive or operate machinery, climb ladders or participate in any activity that could potentially put yourself or others at risk should you get dizzy, or be/feel impaired at all.    Return to emergency room without delay for ANY new or worsening pains or for any other symptoms or concerns. Return with worsening pains, nausea, vomiting, trouble breathing, palpitations, shortness of breath, inability to pass stool or urine, loss of control of stool or urine, any numbness or tingling (that is not normal for you), uncontrolled fevers, the passing of blood or other material in stool or urine, rashes, pains or for any other symptoms or concerns you may have. You are always welcome to return to the ER at any time for any reason or for any other concerns you may have.   all other ROS negative except as per HPI

## 2025-05-09 NOTE — ED BEHAVIORAL HEALTH ASSESSMENT NOTE - MEDICAL RECORD REVIEWED
influenza, high-dose, quadrivalent; 07-Sep-2023 14:53; Akilah Garcia (ERNESTO); [employer]; [id number] (Exp. Date: 07-Jun-2024); IntraMuscular; Deltoid Right.; 0.7 milliLiter(s); VIS (VIS Published: 06-Aug-2021, VIS Presented: 07-Sep-2023);   
Yes